# Patient Record
Sex: MALE | Race: ASIAN | NOT HISPANIC OR LATINO | Employment: FULL TIME | URBAN - METROPOLITAN AREA
[De-identification: names, ages, dates, MRNs, and addresses within clinical notes are randomized per-mention and may not be internally consistent; named-entity substitution may affect disease eponyms.]

---

## 2017-01-06 ENCOUNTER — OFFICE VISIT (OUTPATIENT)
Dept: RETAIL CLINIC | Facility: CLINIC | Age: 47
End: 2017-01-06

## 2017-01-06 VITALS
SYSTOLIC BLOOD PRESSURE: 132 MMHG | OXYGEN SATURATION: 98 % | HEART RATE: 76 BPM | RESPIRATION RATE: 20 BRPM | TEMPERATURE: 98.8 F | DIASTOLIC BLOOD PRESSURE: 96 MMHG

## 2017-01-06 DIAGNOSIS — J01.90 ACUTE RHINOSINUSITIS: Primary | ICD-10-CM

## 2017-01-06 PROCEDURE — 99213 OFFICE O/P EST LOW 20 MIN: CPT | Performed by: NURSE PRACTITIONER

## 2017-01-06 RX ORDER — PREDNISONE 20 MG/1
20 TABLET ORAL 2 TIMES DAILY
Qty: 14 TABLET | Refills: 0 | Status: SHIPPED | OUTPATIENT
Start: 2017-01-06 | End: 2017-01-13

## 2017-01-06 RX ORDER — GUAIFENESIN, PSEUDOEPHEDRINE HYDROCHLORIDE 600; 60 MG/1; MG/1
1 TABLET, EXTENDED RELEASE ORAL EVERY 12 HOURS
COMMUNITY
End: 2018-11-09

## 2017-01-06 RX ORDER — ALBUTEROL SULFATE 90 UG/1
2 AEROSOL, METERED RESPIRATORY (INHALATION) EVERY 4 HOURS PRN
COMMUNITY

## 2017-01-06 RX ORDER — AZELASTINE 1 MG/ML
2 SPRAY, METERED NASAL 2 TIMES DAILY
COMMUNITY
End: 2018-11-09

## 2017-01-06 NOTE — PROGRESS NOTES
Subjective   Ash Licona is a 46 y.o. male.     HPI Comments: Receives weekly allergy injections, last one on 1/4/17. Patient is asthmatic but has not required use of albuterol during the past week.    Sinusitis   This is a new problem. Episode onset: 4 days ago. The problem has been gradually worsening since onset. There has been no fever. Associated symptoms include congestion, headaches, neck pain and sinus pressure. Pertinent negatives include no chills, coughing, diaphoresis, ear pain, hoarse voice, shortness of breath, sneezing, sore throat or swollen glands. Past treatments include nothing.       The following portions of the patient's history were reviewed and updated as appropriate: allergies, current medications, past family history, past medical history, past social history, past surgical history and problem list.    Review of Systems   Constitutional: Positive for fatigue. Negative for appetite change, chills, diaphoresis and fever.   HENT: Positive for congestion, postnasal drip, rhinorrhea and sinus pressure. Negative for dental problem, ear discharge, ear pain, facial swelling, hearing loss, hoarse voice, mouth sores, nosebleeds, sneezing, sore throat, tinnitus, trouble swallowing and voice change.    Eyes: Negative for pain, discharge, redness and itching.   Respiratory: Positive for wheezing. Negative for cough, chest tightness, shortness of breath and stridor.    Cardiovascular: Negative for chest pain and palpitations.   Gastrointestinal: Negative for abdominal pain, constipation, diarrhea, nausea and vomiting.   Genitourinary: Negative for decreased urine volume.   Musculoskeletal: Positive for neck pain. Negative for myalgias and neck stiffness.   Skin: Negative for rash.   Allergic/Immunologic: Positive for environmental allergies.   Neurological: Positive for headaches. Negative for dizziness, syncope and weakness.       Objective   Physical Exam   Constitutional: He is oriented to person,  place, and time. He appears well-developed and well-nourished. He is cooperative.  Non-toxic appearance. He does not appear ill. No distress.   HENT:   Right Ear: Hearing, external ear and ear canal normal. Tympanic membrane is not scarred, not perforated, not erythematous, not retracted and not bulging. A middle ear effusion is present.   Left Ear: Hearing, external ear and ear canal normal. Tympanic membrane is not scarred, not perforated, not erythematous, not retracted and not bulging. A middle ear effusion is present.   Nose: Nose normal. Right sinus exhibits no maxillary sinus tenderness and no frontal sinus tenderness. Left sinus exhibits no maxillary sinus tenderness and no frontal sinus tenderness.   Mouth/Throat: Uvula is midline, oropharynx is clear and moist and mucous membranes are normal. Tonsils are 2+ on the right. Tonsils are 2+ on the left. No tonsillar exudate.   Eyes: Conjunctivae and lids are normal.   Cardiovascular: Normal rate, regular rhythm, S1 normal and S2 normal.    Pulmonary/Chest: Effort normal. He has no decreased breath sounds. He has wheezes in the right upper field and the right middle field. He has no rhonchi. He has no rales.   Abdominal: Soft. Normal appearance and bowel sounds are normal. There is no tenderness.   Lymphadenopathy:     He has no cervical adenopathy.   Neurological: He is alert and oriented to person, place, and time.   Skin: Skin is warm and dry. He is not diaphoretic.   Vitals reviewed.      Assessment/Plan   Ash was seen today for sinusitis.    Diagnoses and all orders for this visit:    Acute rhinosinusitis    Other orders  -     predniSONE (DELTASONE) 20 MG tablet; Take 1 tablet by mouth 2 (Two) Times a Day for 7 days.        -     Monitor BP and notify PCP of BP >140/90        -     Follow up with PCP for persistent or worsening symptoms

## 2017-01-06 NOTE — PATIENT INSTRUCTIONS

## 2017-01-06 NOTE — MR AVS SNAPSHOT
Ash Licona   1/6/2017 10:30 AM   Office Visit    Dept Phone:  [phone]   Encounter #:  [id number]    Provider:  Shannan Rojas   Department:  [facility]                Your Full Care Plan              Today's Medication Changes          These changes are accurate as of: 1/6/17 10:56 AM.  If you have any questions, ask your nurse or doctor.               New Medication(s)Ordered:     predniSONE 20 MG tablet   Commonly known as:  DELTASONE   Take 1 tablet by mouth 2 (Two) Times a Day for 7 days.   Started by:  Provider Maria Teresa Rojas            Where to Get Your Medications      These medications were sent to [id number] - [place], [place] - [address] AT [facility] [address] - [phone]  - [phone]   [address], [place] [address]     Phone:  [phone]     predniSONE 20 MG tablet                  Your Updated Medication List          This list is accurate as of: 1/6/17 10:56 AM.  Always use your most recent med list.                albuterol 108 (90 BASE) MCG/ACT inhaler   Commonly known as:  PROVENTIL HFA;VENTOLIN HFA       azelastine 0.1 % nasal spray   Commonly known as:  ASTELIN       fexofenadine 30 MG tablet   Commonly known as:  ALLEGRA       fluticasone 50 MCG/ACT nasal spray   Commonly known as:  FLONASE       fluticasone-salmeterol 100-50 MCG/DOSE DISKUS   Commonly known as:  ADVAIR       losartan-hydrochlorothiazide 100-25 MG per tablet   Commonly known as:  HYZAAR   Take 1 tablet by mouth daily.       montelukast 10 MG tablet   Commonly known as:  SINGULAIR       omeprazole 10 MG capsule   Commonly known as:  prilOSEC       predniSONE 20 MG tablet   Commonly known as:  DELTASONE   Take 1 tablet by mouth 2 (Two) Times a Day for 7 days.       pseudoephedrine-guaifenesin  MG per 12 hr tablet   Commonly known as:  MUCINEX D       vardenafil 20 MG tablet   Commonly known as:  LEVITRA   Take 1 tablet by  mouth as needed for erectile dysfunction.               You Were Diagnosed With        Codes Comments    Acute rhinosinusitis    -  Primary ICD-10-CM: J01.90  ICD-9-CM: 461.9       Instructions    Sinusitis, Adult  Sinusitis is redness, soreness, and inflammation of the paranasal sinuses. Paranasal sinuses are air pockets within the bones of your face. They are located beneath your eyes, in the middle of your forehead, and above your eyes. In healthy paranasal sinuses, mucus is able to drain out, and air is able to circulate through them by way of your nose. However, when your paranasal sinuses are inflamed, mucus and air can become trapped. This can allow bacteria and other germs to grow and cause infection.  Sinusitis can develop quickly and last only a short time (acute) or continue over a long period (chronic). Sinusitis that lasts for more than 12 weeks is considered chronic.  CAUSES  Causes of sinusitis include:  · Allergies.  · Structural abnormalities, such as displacement of the cartilage that separates your nostrils (deviated septum), which can decrease the air flow through your nose and sinuses and affect sinus drainage.  · Functional abnormalities, such as when the small hairs (cilia) that line your sinuses and help remove mucus do not work properly or are not present.  SIGNS AND SYMPTOMS  Symptoms of acute and chronic sinusitis are the same. The primary symptoms are pain and pressure around the affected sinuses. Other symptoms include:  · Upper toothache.  · Earache.  · Headache.  · Bad breath.  · Decreased sense of smell and taste.  · A cough, which worsens when you are lying flat.  · Fatigue.  · Fever.  · Thick drainage from your nose, which often is green and may contain pus (purulent).  · Swelling and warmth over the affected sinuses.  DIAGNOSIS  Your health care provider will perform a physical exam. During your exam, your health care provider may perform any of the following to help determine if  you have acute sinusitis or chronic sinusitis:  · Look in your nose for signs of abnormal growths in your nostrils (nasal polyps).  · Tap over the affected sinus to check for signs of infection.  · View the inside of your sinuses using an imaging device that has a light attached (endoscope).  If your health care provider suspects that you have chronic sinusitis, one or more of the following tests may be recommended:  · Allergy tests.  · Nasal culture. A sample of mucus is taken from your nose, sent to a lab, and screened for bacteria.  · Nasal cytology. A sample of mucus is taken from your nose and examined by your health care provider to determine if your sinusitis is related to an allergy.  TREATMENT  Most cases of acute sinusitis are related to a viral infection and will resolve on their own within 10 days. Sometimes, medicines are prescribed to help relieve symptoms of both acute and chronic sinusitis. These may include pain medicines, decongestants, nasal steroid sprays, or saline sprays.  However, for sinusitis related to a bacterial infection, your health care provider will prescribe antibiotic medicines. These are medicines that will help kill the bacteria causing the infection.  Rarely, sinusitis is caused by a fungal infection. In these cases, your health care provider will prescribe antifungal medicine.  For some cases of chronic sinusitis, surgery is needed. Generally, these are cases in which sinusitis recurs more than 3 times per year, despite other treatments.  HOME CARE INSTRUCTIONS  · Drink plenty of water. Water helps thin the mucus so your sinuses can drain more easily.  · Use a humidifier.  · Inhale steam 3-4 times a day (for example, sit in the bathroom with the shower running).  · Apply a warm, moist washcloth to your face 3-4 times a day, or as directed by your health care provider.  · Use saline nasal sprays to help moisten and clean your sinuses.  · Take medicines only as directed by your  health care provider.  · If you were prescribed either an antibiotic or antifungal medicine, finish it all even if you start to feel better.  SEEK IMMEDIATE MEDICAL CARE IF:  · You have increasing pain or severe headaches.  · You have nausea, vomiting, or drowsiness.  · You have swelling around your face.  · You have vision problems.  · You have a stiff neck.  · You have difficulty breathing.     This information is not intended to replace advice given to you by your health care provider. Make sure you discuss any questions you have with your health care provider.     Document Released: 12/18/2006 Document Revised: 01/08/2016 Document Reviewed: 01/01/2013  Real[employer] Interactive Patient Education ©2016 Elsevier Inc.       Patient Instructions History      Upcoming Appointments     Visit Type Date Time Department    OFFICE VISIT 1/6/2017 10:30 AM [facility]      avolution Signup     Our records indicate that you have an active Rewarder account.    You can view your After Visit Summary by going to MobileIgniter and logging in with your avolution username and password.  If you don't have a avolution username and password but a parent or guardian has access to your record, the parent or guardian should login with their own avolution username and password and access your record to view the After Visit Summary.    If you have questions, you can email General [email or web address] or call [phone] to talk to our avolution staff.  Remember, avolution is NOT to be used for urgent needs.  For medical emergencies, dial 911.               Other Info from Your Visit           Allergies     Iodine        Reason for Visit     Sinusitis           Vital Signs     Smoking Status                   Former Smoker           Problems and Diagnoses Noted     Acute sinus infection    -  Primary

## 2017-01-17 ENCOUNTER — HOSPITAL ENCOUNTER (OUTPATIENT)
Dept: GENERAL RADIOLOGY | Facility: HOSPITAL | Age: 47
Discharge: HOME | End: 2017-01-17
Admitting: NURSE PRACTITIONER

## 2017-01-17 ENCOUNTER — TELEPHONE (OUTPATIENT)
Dept: INTERNAL MEDICINE | Facility: CLINIC | Age: 47
End: 2017-01-17

## 2017-01-17 DIAGNOSIS — R06.02 SHORTNESS OF BREATH: ICD-10-CM

## 2017-01-17 DIAGNOSIS — R09.89 CHEST CONGESTION: Primary | ICD-10-CM

## 2017-01-17 PROCEDURE — 71020 HC CHEST PA AND LATERAL: CPT

## 2017-01-17 NOTE — TELEPHONE ENCOUNTER
----- Message from Shabana Willett MA sent at 2017  1:16 PM EST -----  Regarding: FW: X-RAY  Contact: [phone]      ----- Message -----     From: Sophia Rubio     Sent: 2017   1:02 PM       To: Navneet Magallanes [facility] Clinical Pool  Subject: X-RAY                                            :  68    CANDIDO PATIENT    PATIENT IS HAVING TROUBLE BREATHING. HIS WIFE WAS WONDERING IF HE SHOULD HAVE AN X-RAY BEFORE TOMORROW'S VISIT.

## 2017-01-17 NOTE — TELEPHONE ENCOUNTER
Patient's wife advised as per below.      ----- Message from Shabana Willett MA sent at 1/17/2017  4:01 PM EST -----      ----- Message -----     From: NAZANIN Collins     Sent: 1/17/2017   3:44 PM       To: Navneet Magallanes [facility] Clinical Pool    CXR is negative for pneumonia  ----- Message -----     From: Interface, Rad Results [place] In     Sent: 1/17/2017   3:35 PM       To: NAZANIN Collins

## 2017-01-17 NOTE — TELEPHONE ENCOUNTER
Spoke with wife.  Actually, patient did NOT go to [facility] ER.  He was evaluated by our [facility] on  for sinusitis and given Deltasone only.  He chest congestion was not an issue then.  He developed chest congestion within the last few days and developed SOA last night with some dizziness.  Per my conversation with Candido, I ordered CXR PA/LAT stat, and patient is going now for that test.  I explained to wife that patient needs to proceed to [facility] ER immediately if SOA worsens before appt tomorrow.  She voiced understanding.      ----- Message from Shabana Willett MA sent at 2017  1:16 PM EST -----  Regarding: FW: X-RAY  Contact: [phone]      ----- Message -----     From: Sophia Rubio     Sent: 2017   1:02 PM       [facility]  Subject: X-RAY                                            :  68    CANDIDO PATIENT    PATIENT IS HAVING TROUBLE BREATHING. HIS WIFE WAS WONDERING IF HE SHOULD HAVE AN X-RAY BEFORE TOMORROW'S VISIT.

## 2017-01-18 ENCOUNTER — OFFICE VISIT (OUTPATIENT)
Dept: INTERNAL MEDICINE | Facility: CLINIC | Age: 47
End: 2017-01-18

## 2017-01-18 VITALS
SYSTOLIC BLOOD PRESSURE: 144 MMHG | DIASTOLIC BLOOD PRESSURE: 90 MMHG | HEIGHT: 72 IN | BODY MASS INDEX: 29.8 KG/M2 | OXYGEN SATURATION: 98 % | WEIGHT: 220 LBS | TEMPERATURE: 97.9 F | HEART RATE: 71 BPM

## 2017-01-18 DIAGNOSIS — J10.1 INFLUENZA A: ICD-10-CM

## 2017-01-18 DIAGNOSIS — J45.901 ASTHMATIC BRONCHITIS WITH ACUTE EXACERBATION: Primary | ICD-10-CM

## 2017-01-18 DIAGNOSIS — R50.9 FEVER AND CHILLS: ICD-10-CM

## 2017-01-18 LAB
ALBUMIN SERPL-MCNC: 4.7 G/DL (ref 3.5–5.2)
ALBUMIN/GLOB SERPL: 1.7 G/DL
ALP SERPL-CCNC: 57 U/L (ref 40–129)
ALT SERPL-CCNC: 27 U/L (ref 5–41)
AST SERPL-CCNC: 27 U/L (ref 5–40)
BASOPHILS # BLD AUTO: 0.06 10*3/MM3 (ref 0–0.2)
BASOPHILS NFR BLD AUTO: 1 % (ref 0–2)
BILIRUB SERPL-MCNC: 0.5 MG/DL (ref 0.2–1.2)
BUN SERPL-MCNC: 11 MG/DL (ref 6–20)
BUN/CREAT SERPL: 13.8 (ref 7–25)
CALCIUM SERPL-MCNC: 9.2 MG/DL (ref 8.6–10.5)
CHLORIDE SERPL-SCNC: 92 MMOL/L (ref 98–107)
CO2 SERPL-SCNC: 27.1 MMOL/L (ref 22–29)
CREAT SERPL-MCNC: 0.8 MG/DL (ref 0.76–1.27)
EOSINOPHIL # BLD AUTO: 0.08 10*3/MM3 (ref 0.1–0.3)
EOSINOPHIL NFR BLD AUTO: 1.3 % (ref 0–4)
ERYTHROCYTE [DISTWIDTH] IN BLOOD BY AUTOMATED COUNT: 13.2 % (ref 11.5–14.5)
GLOBULIN SER CALC-MCNC: 2.8 GM/DL
GLUCOSE SERPL-MCNC: 98 MG/DL (ref 65–99)
HCT VFR BLD AUTO: 47.3 % (ref 42–52)
HGB BLD-MCNC: 15.9 G/DL (ref 14–18)
IMM GRANULOCYTES # BLD: 0.06 10*3/MM3 (ref 0–0.03)
IMM GRANULOCYTES NFR BLD: 1 % (ref 0–0.5)
LYMPHOCYTES # BLD AUTO: 0.98 10*3/MM3 (ref 0.6–4.8)
LYMPHOCYTES NFR BLD AUTO: 16.2 % (ref 20–45)
MCH RBC QN AUTO: 28.8 PG (ref 27–31)
MCHC RBC AUTO-ENTMCNC: 33.6 G/DL (ref 31–37)
MCV RBC AUTO: 85.5 FL (ref 80–94)
MONOCYTES # BLD AUTO: 0.64 10*3/MM3 (ref 0–1)
MONOCYTES NFR BLD AUTO: 10.6 % (ref 3–8)
NEUTROPHILS # BLD AUTO: 4.23 10*3/MM3 (ref 1.5–8.3)
NEUTROPHILS NFR BLD AUTO: 69.9 % (ref 45–70)
NRBC BLD AUTO-RTO: 0 /100 WBC (ref 0–0)
PLATELET # BLD AUTO: 298 10*3/MM3 (ref 140–500)
POTASSIUM SERPL-SCNC: 3.5 MMOL/L (ref 3.5–5.2)
PROT SERPL-MCNC: 7.5 G/DL (ref 6–8.5)
RBC # BLD AUTO: 5.53 10*6/MM3 (ref 4.7–6.1)
SODIUM SERPL-SCNC: 134 MMOL/L (ref 136–145)
WBC # BLD AUTO: 6.05 10*3/MM3 (ref 4.8–10.8)

## 2017-01-18 PROCEDURE — 99213 OFFICE O/P EST LOW 20 MIN: CPT | Performed by: NURSE PRACTITIONER

## 2017-01-18 RX ORDER — OSELTAMIVIR PHOSPHATE 75 MG/1
75 CAPSULE ORAL 2 TIMES DAILY
Qty: 10 CAPSULE | Refills: 0 | Status: SHIPPED | OUTPATIENT
Start: 2017-01-18 | End: 2018-11-09

## 2017-01-18 NOTE — PATIENT INSTRUCTIONS
Influenza Tests  WHY AM I HAVING THIS TEST?  You may have an influenza test to help your health care provider determine what type of respiratory infection you have. The test may also be used to help determine a treatment plan and to monitor influenza activity within a community.  There are two types of influenza virus: types A and B. Often, one strain of type A influenza will be the most common type of influenza in a community during flu season. This is typically between the months of October and May. Influenza tests can help determine which strain of influenza type A is occurring most often in the community.  WHAT KIND OF SAMPLE IS TAKEN?  Influenza tests are performed by collecting a small sample of fluids (secretions) from your nose or throat using a cotton swab. Tests performed on nasal secretions are more accurate than tests performed on a sample taken from your throat.  · Rapid influenza tests are available and have become the most frequently used tests for influenza. They are most accurate when completed within the first 48 hours after your symptoms begin.    Depending on the method, a rapid influenza test may be completed in your health care provider's office in less than 30 minutes. It can also be sent to a lab with the results available the same day.    Depending on the particular type of test used, it can identify influenza type A, a mixture of types A and B, or differentiate between type A and B.  · Another test that your health care provider may order is a viral culture. This also requires the collection of secretions from your nose or throat. The sample is then sent to a lab for processing. This may take several days to complete.  HOW ARE YOUR TEST RESULTS REPORTED?  Your test results will be reported as either positive or negative. A false-negative result can occur. A false-negative result is incorrect because it indicates a condition or finding is not present when it is.  It is your responsibility to  obtain your test results. Ask the lab or department performing the test when and how you will get your results.  WHAT DO THE RESULTS MEAN?  · A positive test means you have influenza. Tests may further determine the type of influenza you have.  · A negative influenza test result means it is not likely that you have influenza.  · A false-negative result can occur. False-negative results are more likely to happen at the height of the influenza season.  Talk with your health care provider to discuss your results, treatment options, and if necessary, the need for more tests. Talk with your health care provider if you have any questions about your results.     This information is not intended to replace advice given to you by your health care provider. Make sure you discuss any questions you have with your health care provider.     Document Released: 09/27/2006 Document Revised: 01/08/2016 Document Reviewed: 05/06/2015  Elsevier Interactive Patient Education ©2016 Elsevier Inc.

## 2017-01-18 NOTE — MR AVS SNAPSHOT
Ash Livan   1/18/2017 9:30 AM   Office Visit    Dept Phone:  [phone]   Encounter #:  [id number]    Provider:  NAZANIN Collins   Department:  [facility] P[facility]                Your Full Care Plan              Today's Medication Changes          These changes are accurate as of: 1/18/17 10:43 AM.  If you have any questions, ask your nurse or doctor.               New Medication(s)Ordered:     fluticasone-salmeterol 250-50 MCG/DOSE DISKUS   Commonly known as:  ADVAIR DISKUS   Inhale 1 puff 2 (Two) Times a Day.   Replaces:  fluticasone-salmeterol 100-50 MCG/DOSE DISKUS   Started by:  NAZANIN Collins       oseltamivir 75 MG capsule   Commonly known as:  TAMIFLU   Take 1 capsule by mouth 2 (Two) Times a Day.   Started by:  NAZANIN Collins         Stop taking medication(s)listed here:     fluticasone-salmeterol 100-50 MCG/DOSE DISKUS   Commonly known as:  ADVAIR   Replaced by:  fluticasone-salmeterol 250-50 MCG/DOSE DISKUS   Stopped by:  NAZANIN Collins                Where to Get Your Medications      These medications were sent to [facility] [address] [place] [address] AT [facility] & [address] - [phone]  - [phone] [address] [address]     Phone:  [phone]     fluticasone-salmeterol 250-50 MCG/DOSE DISKUS    oseltamivir 75 MG capsule                  Your Updated Medication List          This list is accurate as of: 1/18/17 10:43 AM.  Always use your most recent med list.                albuterol 108 (90 BASE) MCG/ACT inhaler   Commonly known as:  PROVENTIL HFA;VENTOLIN HFA       azelastine 0.1 % nasal spray   Commonly known as:  ASTELIN       fexofenadine 30 MG tablet   Commonly known as:  ALLEGRA       fluticasone 50 MCG/ACT nasal spray   Commonly known as:  FLONASE       fluticasone-salmeterol 250-50 MCG/DOSE DISKUS   Commonly known as:  ADVAIR DISKUS   Inhale 1 puff 2 (Two)  Times a Day.       losartan-hydrochlorothiazide 100-25 MG per tablet   Commonly known as:  HYZAAR   Take 1 tablet by mouth daily.       montelukast 10 MG tablet   Commonly known as:  SINGULAIR       omeprazole 10 MG capsule   Commonly known as:  prilOSEC       oseltamivir 75 MG capsule   Commonly known as:  TAMIFLU   Take 1 capsule by mouth 2 (Two) Times a Day.       pseudoephedrine-guaifenesin  MG per 12 hr tablet   Commonly known as:  MUCINEX D       vardenafil 20 MG tablet   Commonly known as:  LEVITRA   Take 1 tablet by mouth as needed for erectile dysfunction.               We Performed the Following     CBC & Differential     Comprehensive metabolic panel     eGFR-Glomerular Filtration       You Were Diagnosed With        Codes Comments    Asthmatic bronchitis with acute exacerbation    -  Primary ICD-10-CM: J45.901  ICD-9-CM: 493.92     Fever and chills     ICD-10-CM: R50.9  ICD-9-CM: 780.60     Influenza A     ICD-10-CM: J10.1  ICD-9-CM: 487.1       Instructions    Influenza Tests  WHY AM I HAVING THIS TEST?  You may have an influenza test to help your health care provider determine what type of respiratory infection you have. The test may also be used to help determine a treatment plan and to monitor influenza activity within a community.  There are two types of influenza virus: types A and B. Often, one strain of type A influenza will be the most common type of influenza in a community during flu season. This is typically between the months of October and May. Influenza tests can help determine which strain of influenza type A is occurring most often in the community.  WHAT KIND OF SAMPLE IS TAKEN?  Influenza tests are performed by collecting a small sample of fluids (secretions) from your nose or throat using a cotton swab. Tests performed on nasal secretions are more accurate than tests performed on a sample taken from your throat.  · Rapid influenza tests are available and have become the most  frequently used tests for influenza. They are most accurate when completed within the first 48 hours after your symptoms begin.    Depending on the method, a rapid influenza test may be completed in your health care provider's office in less than 30 minutes. It can also be sent to a lab with the results available the same day.    Depending on the particular type of test used, it can identify influenza type A, a mixture of types A and B, or differentiate between type A and B.  · Another test that your health care provider may order is a viral culture. This also requires the collection of secretions from your nose or throat. The sample is then sent to a lab for processing. This may take several days to complete.  HOW ARE YOUR TEST RESULTS REPORTED?  Your test results will be reported as either positive or negative. A false-negative result can occur. A false-negative result is incorrect because it indicates a condition or finding is not present when it is.  It is your responsibility to obtain your test results. Ask the lab or department performing the test when and how you will get your results.  WHAT DO THE RESULTS MEAN?  · A positive test means you have influenza. Tests may further determine the type of influenza you have.  · A negative influenza test result means it is not likely that you have influenza.  · A false-negative result can occur. False-negative results are more likely to happen at the height of the influenza season.  Talk with your health care provider to discuss your results, treatment options, and if necessary, the need for more tests. Talk with your health care provider if you have any questions about your results.     This information is not intended to replace advice given to you by your health care provider. Make sure you discuss any questions you have with your health care provider.     Document Released: 09/27/2006 Document Revised: 01/08/2016 Document Reviewed: 05/06/2015  [employer]  "Patient Education ©2016 [email or web address].       Patient Instructions History      Upcoming Appointments     Visit Type Date Time Department    HOSPITAL FOLLOW UP 1/18/2017  9:30 AM [facility]      [email or web address] Signup     Our records indicate that you have an active Jugo account.    You can view your After Visit Summary by going to Deliveroo and logging in with your [email or web address] username and password.  If you don't have a [email or web address] username and password but a parent or guardian has access to your record, the parent or guardian should login with their own [email or web address] username and password and access your record to view the After Visit Summary.    If you have questions, you can email [email or web address] or call [phone] to talk to our [email or web address] staff.  Remember, [email or web address] is NOT to be used for urgent needs.  For medical emergencies, dial 911.               Other Info from Your Visit           Allergies     Iodine        Reason for Visit     URI     Sinusitis     Shortness of Breath     Asthma           Vital Signs     Blood Pressure Pulse Temperature Height Weight Oxygen Saturation    144/90 71 97.9 °F (36.6 °C) 72\" (182.9 cm) 220 lb (99.8 kg) 98%    Body Mass Index Smoking Status                29.84 kg/m2 Former Smoker          Problems and Diagnoses Noted     Asthmatic bronchitis with acute exacerbation    Influenza A    Fever and chills            "

## 2017-01-18 NOTE — PROGRESS NOTES
Chief Complaint   Patient presents with   • URI   • Sinusitis   • Shortness of Breath   • Asthma       Subjective   Ash Licona is a 46 y.o. male is being seen for an acute appointment for URI symptoms. This started 2 weeks ago. It started with sinus pressure, nasal congestion. He went to the urgent care 1-6-2017, and he was given 7 days of prednisone. He has progressively worsened Now with fever, chills, nasal congestion, chest congestion. He did have a flu shot. CXR complete yesterday with normal results. Associated weakness and vertigo. He s currently on Singulair, Astelin, Allegra, Advair 100/50. He is using his rescue inhaler daily. He tried pseudafed 24 hr.     History of Present Illness     Current Outpatient Prescriptions on File Prior to Visit   Medication Sig Dispense Refill   • albuterol (PROVENTIL HFA;VENTOLIN HFA) 108 (90 BASE) MCG/ACT inhaler Inhale 2 puffs Every 4 (Four) Hours As Needed for wheezing or shortness of air.     • azelastine (ASTELIN) 0.1 % nasal spray 2 sprays into each nostril 2 (Two) Times a Day. Use in each nostril as directed     • fexofenadine (ALLEGRA) 30 MG tablet Take 30 mg by mouth 2 (two) times a day.     • fluticasone (FLONASE) 50 MCG/ACT nasal spray into each nostril.     • fluticasone-salmeterol (ADVAIR) 100-50 MCG/DOSE DISKUS every 12 (twelve) hours.     • losartan-hydrochlorothiazide (HYZAAR) 100-25 MG per tablet Take 1 tablet by mouth daily. 30 tablet 6   • montelukast (SINGULAIR) 10 MG tablet Take  by mouth.     • omeprazole (PriLOSEC) 10 MG capsule Take 10 mg by mouth daily.     • pseudoephedrine-guaifenesin (MUCINEX D)  MG per 12 hr tablet Take 1 tablet by mouth Every 12 (Twelve) Hours.     • vardenafil (LEVITRA) 20 MG tablet Take 1 tablet by mouth as needed for erectile dysfunction. 6 tablet 0     No current facility-administered medications on file prior to visit.        The following portions of the patient's history were reviewed and updated as appropriate:  allergies, current medications, past family history, past medical history, past social history, past surgical history and problem list.    Review of Systems   Constitutional: Positive for activity change, appetite change, chills, fatigue and fever.   HENT: Positive for congestion, postnasal drip, rhinorrhea, sneezing and voice change. Negative for ear discharge, ear pain and sore throat.    Eyes: Negative.    Respiratory: Positive for cough and shortness of breath. Negative for wheezing.    Cardiovascular: Negative for chest pain, palpitations and leg swelling.   Gastrointestinal: Negative.    Endocrine: Negative.    Allergic/Immunologic: Positive for environmental allergies.   Neurological: Negative.        Objective   Physical Exam   Constitutional: He appears lethargic. He has a sickly appearance.   HENT:   Head: Normocephalic.   Right Ear: External ear normal.   Left Ear: External ear normal.   Nose: Nose normal.   Mouth/Throat: Oropharynx is clear and moist. No oropharyngeal exudate.   Eyes: Right eye exhibits no discharge. Left eye exhibits no discharge.   Neck: Neck supple.   Cardiovascular: Normal rate, regular rhythm and normal heart sounds.    No murmur heard.  Pulmonary/Chest: Effort normal. No respiratory distress. He has decreased breath sounds. He has no wheezes.   Abdominal: Soft. Bowel sounds are normal.   Lymphadenopathy:     He has cervical adenopathy.   Neurological: He appears lethargic.   Skin: Skin is warm and dry.   Psychiatric: His behavior is normal. His affect is blunt.       Assessment/Plan   Ash was seen today for uri, sinusitis, shortness of breath and asthma.    Diagnoses and all orders for this visit:    Asthmatic bronchitis with acute exacerbation  -     CBC & Differential  -     Comprehensive metabolic panel  -     eGFR-Glomerular Filtration  -     fluticasone-salmeterol (ADVAIR DISKUS) 250-50 MCG/DOSE DISKUS; Inhale 1 puff 2 (Two) Times a Day.    Fever and chills  -     CBC &  Differential  -     Comprehensive metabolic panel  -     eGFR-Glomerular Filtration    Influenza A  -     CBC & Differential  -     Comprehensive metabolic panel  -     eGFR-Glomerular Filtration  -     oseltamivir (TAMIFLU) 75 MG capsule; Take 1 capsule by mouth 2 (Two) Times a Day.    Other orders  -     Cancel: Mononucleosis Screen  -     Cancel: TSH    Viral precautions, rest and fluids.

## 2017-03-31 RX ORDER — LOSARTAN POTASSIUM AND HYDROCHLOROTHIAZIDE 25; 100 MG/1; MG/1
1 TABLET ORAL DAILY
Qty: 30 TABLET | Refills: 6 | Status: SHIPPED | OUTPATIENT
Start: 2017-03-31 | End: 2018-02-05 | Stop reason: SDUPTHER

## 2017-04-08 ENCOUNTER — OFFICE VISIT (OUTPATIENT)
Dept: RETAIL CLINIC | Facility: CLINIC | Age: 47
End: 2017-04-08

## 2017-04-08 VITALS
RESPIRATION RATE: 16 BRPM | SYSTOLIC BLOOD PRESSURE: 114 MMHG | HEART RATE: 104 BPM | TEMPERATURE: 99.1 F | DIASTOLIC BLOOD PRESSURE: 74 MMHG | OXYGEN SATURATION: 98 %

## 2017-04-08 DIAGNOSIS — J01.00 ACUTE MAXILLARY SINUSITIS, RECURRENCE NOT SPECIFIED: Primary | ICD-10-CM

## 2017-04-08 PROBLEM — J34.9 SINUS PROBLEM: Status: ACTIVE | Noted: 2017-04-08

## 2017-04-08 PROCEDURE — 99213 OFFICE O/P EST LOW 20 MIN: CPT | Performed by: NURSE PRACTITIONER

## 2017-04-08 RX ORDER — AMOXICILLIN AND CLAVULANATE POTASSIUM 875; 125 MG/1; MG/1
1 TABLET, FILM COATED ORAL 2 TIMES DAILY
Qty: 20 TABLET | Refills: 0 | Status: SHIPPED | OUTPATIENT
Start: 2017-04-08 | End: 2017-04-18

## 2017-04-08 NOTE — PROGRESS NOTES
Subjective   Ash Licona is a 46 y.o. male.     Sinus Problem   The current episode started 1 to 4 weeks ago. The problem has been gradually worsening since onset. Maximum temperature: 99-99.9. The fever has been present for 1 to 2 days. His pain is at a severity of 6/10. The pain is moderate. Associated symptoms include congestion, coughing (history of asthma, occasional cough), headaches and sinus pressure. Pertinent negatives include no ear pain, neck pain or sore throat. Past treatments include oral decongestants and acetaminophen. The treatment provided mild relief.       The following portions of the patient's history were reviewed and updated as appropriate: allergies, current medications, past family history, past medical history, past social history, past surgical history and problem list.    Review of Systems   Constitutional: Positive for activity change, appetite change, fatigue and fever.   HENT: Positive for congestion, rhinorrhea and sinus pressure. Negative for ear discharge, ear pain, sore throat and trouble swallowing.    Eyes: Negative for pain, discharge, redness, itching and visual disturbance.   Respiratory: Positive for cough (history of asthma, occasional cough) and wheezing (occasional, relates to asthma history, has inhaler). Negative for chest tightness.    Cardiovascular: Negative for chest pain and palpitations.   Gastrointestinal: Negative for abdominal distention, abdominal pain, constipation, diarrhea, nausea and vomiting.   Endocrine: Negative for cold intolerance.   Genitourinary: Negative for difficulty urinating.   Musculoskeletal: Negative for neck pain and neck stiffness.   Skin: Negative for color change, pallor and rash.   Allergic/Immunologic: Positive for environmental allergies.   Neurological: Positive for headaches. Negative for dizziness.   Psychiatric/Behavioral: Negative for agitation and behavioral problems.   All other systems reviewed and are  negative.      Objective   Physical Exam   Constitutional: He is oriented to person, place, and time. He appears well-developed and well-nourished.   HENT:   Head: Normocephalic.   Right Ear: Hearing, tympanic membrane, external ear and ear canal normal.   Left Ear: Hearing, external ear and ear canal normal. Tympanic membrane is erythematous and retracted.   Nose: Rhinorrhea and sinus tenderness present. Right sinus exhibits maxillary sinus tenderness and frontal sinus tenderness. Left sinus exhibits maxillary sinus tenderness and frontal sinus tenderness.   Mouth/Throat: Uvula is midline, oropharynx is clear and moist and mucous membranes are normal.   Purulent nasal drainage   Eyes: Lids are normal. Pupils are equal, round, and reactive to light.   Neck: Trachea normal and full passive range of motion without pain. Neck supple.   Cardiovascular: Normal rate, regular rhythm and normal heart sounds.    Pulmonary/Chest: Effort normal and breath sounds normal.   Occasional coarse cough noted.  Not productive during exam. History of asthma, patient has asthma maintenance plan and is educated on the use.  No need for refills. Normal exam, no increased work of breathing.  Pink and perfused on room air.   Abdominal: Soft. Normal appearance and bowel sounds are normal. There is no tenderness.   Musculoskeletal: Normal range of motion.   Lymphadenopathy:     He has no cervical adenopathy.   Neurological: He is alert and oriented to person, place, and time. He has normal strength.   Skin: Skin is warm, dry and intact. No rash noted.   Psychiatric: He has a normal mood and affect. His speech is normal. Judgment and thought content normal. Cognition and memory are normal.       Assessment/Plan   Ash was seen today for sinus problem.    Diagnoses and all orders for this visit:    Acute maxillary sinusitis, recurrence not specified  -     amoxicillin-clavulanate (AUGMENTIN) 875-125 MG per tablet; Take 1 tablet by mouth 2  (Two) Times a Day for 10 days.       Patient agrees with treatment plan and understands when to return to PCP or seek ER care.  Patient understands to adhere to asthma plan as needed for symptoms.

## 2017-10-10 ENCOUNTER — FLU SHOT (OUTPATIENT)
Dept: INTERNAL MEDICINE | Facility: CLINIC | Age: 47
End: 2017-10-10

## 2017-10-10 PROCEDURE — 90686 IIV4 VACC NO PRSV 0.5 ML IM: CPT | Performed by: NURSE PRACTITIONER

## 2017-10-10 PROCEDURE — 90471 IMMUNIZATION ADMIN: CPT | Performed by: NURSE PRACTITIONER

## 2018-02-05 RX ORDER — LOSARTAN POTASSIUM AND HYDROCHLOROTHIAZIDE 25; 100 MG/1; MG/1
1 TABLET ORAL DAILY
Qty: 90 TABLET | Refills: 1 | Status: SHIPPED | OUTPATIENT
Start: 2018-02-05 | End: 2018-10-30 | Stop reason: SDUPTHER

## 2018-02-05 RX ORDER — LOSARTAN POTASSIUM AND HYDROCHLOROTHIAZIDE 25; 100 MG/1; MG/1
1 TABLET ORAL DAILY
Qty: 30 TABLET | Refills: 6 | Status: SHIPPED | OUTPATIENT
Start: 2018-02-05 | End: 2018-02-05 | Stop reason: SDUPTHER

## 2018-08-02 RX ORDER — LOSARTAN POTASSIUM AND HYDROCHLOROTHIAZIDE 25; 100 MG/1; MG/1
1 TABLET ORAL DAILY
Qty: 90 TABLET | Refills: 0 | OUTPATIENT
Start: 2018-08-02

## 2018-10-30 ENCOUNTER — TELEPHONE (OUTPATIENT)
Dept: INTERNAL MEDICINE | Facility: CLINIC | Age: 48
End: 2018-10-30

## 2018-10-30 RX ORDER — LOSARTAN POTASSIUM AND HYDROCHLOROTHIAZIDE 25; 100 MG/1; MG/1
1 TABLET ORAL DAILY
Qty: 30 TABLET | Refills: 0 | Status: SHIPPED | OUTPATIENT
Start: 2018-10-30 | End: 2018-11-09 | Stop reason: SDUPTHER

## 2018-10-30 NOTE — TELEPHONE ENCOUNTER
SENT TO PHARMACY   WIFE AWARE      ----- Message from NAZANIN Collins sent at 10/30/2018 12:38 PM EDT -----  Regarding: RE: SCRIPT VS APPT  Yes, send in a 30 day script.    ----- Message -----  From: Veda Dinero MA  Sent: 10/30/2018  12:37 PM  To: NAZANIN Collins  Subject: FW: SCRIPT VS APPT                                   ----- Message -----  From: Yenny Cabrales  Sent: 10/30/2018  12:24 PM  To: Veda Dinero MA  Subject: SCRIPT VS APPT                                   ARNOL    Patient has made appointment for BP/Med Check on Friday 11/9 and a CPE in February.  He will run out of Losartan on Sunday 11/4.  Can script be sent in for same to at least get him to appointment date?

## 2018-11-09 ENCOUNTER — OFFICE VISIT (OUTPATIENT)
Dept: INTERNAL MEDICINE | Facility: CLINIC | Age: 48
End: 2018-11-09

## 2018-11-09 VITALS
WEIGHT: 221 LBS | SYSTOLIC BLOOD PRESSURE: 120 MMHG | HEIGHT: 72 IN | HEART RATE: 62 BPM | BODY MASS INDEX: 29.93 KG/M2 | OXYGEN SATURATION: 98 % | DIASTOLIC BLOOD PRESSURE: 82 MMHG

## 2018-11-09 DIAGNOSIS — Z23 NEEDS FLU SHOT: Primary | ICD-10-CM

## 2018-11-09 DIAGNOSIS — Z23 IMMUNIZATION DUE: ICD-10-CM

## 2018-11-09 DIAGNOSIS — I10 BENIGN ESSENTIAL HTN: ICD-10-CM

## 2018-11-09 DIAGNOSIS — E78.2 MIXED HYPERLIPIDEMIA: ICD-10-CM

## 2018-11-09 DIAGNOSIS — Z12.5 PROSTATE CANCER SCREENING: ICD-10-CM

## 2018-11-09 PROBLEM — J10.1 INFLUENZA A: Status: RESOLVED | Noted: 2017-01-18 | Resolved: 2018-11-09

## 2018-11-09 PROBLEM — J34.9 SINUS PROBLEM: Status: RESOLVED | Noted: 2017-04-08 | Resolved: 2018-11-09

## 2018-11-09 PROCEDURE — 90674 CCIIV4 VAC NO PRSV 0.5 ML IM: CPT | Performed by: NURSE PRACTITIONER

## 2018-11-09 PROCEDURE — 90471 IMMUNIZATION ADMIN: CPT | Performed by: NURSE PRACTITIONER

## 2018-11-09 PROCEDURE — 90472 IMMUNIZATION ADMIN EACH ADD: CPT | Performed by: NURSE PRACTITIONER

## 2018-11-09 PROCEDURE — 90632 HEPA VACCINE ADULT IM: CPT | Performed by: NURSE PRACTITIONER

## 2018-11-09 PROCEDURE — 99213 OFFICE O/P EST LOW 20 MIN: CPT | Performed by: NURSE PRACTITIONER

## 2018-11-09 PROCEDURE — 90715 TDAP VACCINE 7 YRS/> IM: CPT | Performed by: NURSE PRACTITIONER

## 2018-11-09 RX ORDER — LOSARTAN POTASSIUM AND HYDROCHLOROTHIAZIDE 25; 100 MG/1; MG/1
1 TABLET ORAL DAILY
Qty: 90 TABLET | Refills: 3 | Status: SHIPPED | OUTPATIENT
Start: 2018-11-09 | End: 2019-11-13 | Stop reason: SDUPTHER

## 2019-02-01 ENCOUNTER — LAB (OUTPATIENT)
Dept: INTERNAL MEDICINE | Facility: CLINIC | Age: 49
End: 2019-02-01

## 2019-02-01 DIAGNOSIS — Z12.5 PROSTATE CANCER SCREENING: ICD-10-CM

## 2019-02-01 DIAGNOSIS — E78.2 MIXED HYPERLIPIDEMIA: ICD-10-CM

## 2019-02-01 DIAGNOSIS — I10 BENIGN ESSENTIAL HTN: ICD-10-CM

## 2019-02-01 LAB
ALBUMIN SERPL-MCNC: 4.8 G/DL (ref 3.5–5.2)
ALBUMIN/GLOB SERPL: 1.7 G/DL
ALP SERPL-CCNC: 48 U/L (ref 40–129)
ALT SERPL-CCNC: 38 U/L (ref 5–41)
AST SERPL-CCNC: 26 U/L (ref 5–40)
BASOPHILS # BLD AUTO: 0.06 10*3/MM3 (ref 0–0.2)
BASOPHILS NFR BLD AUTO: 1.3 % (ref 0–2)
BILIRUB SERPL-MCNC: 0.5 MG/DL (ref 0.2–1.2)
BUN SERPL-MCNC: 11 MG/DL (ref 6–20)
BUN/CREAT SERPL: 14.3 (ref 7–25)
CALCIUM SERPL-MCNC: 9.7 MG/DL (ref 8.6–10.5)
CHLORIDE SERPL-SCNC: 91 MMOL/L (ref 98–107)
CHOLEST SERPL-MCNC: 207 MG/DL (ref 0–200)
CHOLEST/HDLC SERPL: 4.81 {RATIO}
CO2 SERPL-SCNC: 27.5 MMOL/L (ref 22–29)
CREAT SERPL-MCNC: 0.77 MG/DL (ref 0.76–1.27)
EOSINOPHIL # BLD AUTO: 0.11 10*3/MM3 (ref 0.1–0.3)
EOSINOPHIL NFR BLD AUTO: 2.4 % (ref 0–4)
ERYTHROCYTE [DISTWIDTH] IN BLOOD BY AUTOMATED COUNT: 12.7 % (ref 11.5–14.5)
GLOBULIN SER CALC-MCNC: 2.9 GM/DL
GLUCOSE SERPL-MCNC: 95 MG/DL (ref 65–99)
HCT VFR BLD AUTO: 48.1 % (ref 42–52)
HDLC SERPL-MCNC: 43 MG/DL (ref 40–60)
HGB BLD-MCNC: 16.3 G/DL (ref 14–18)
IMM GRANULOCYTES # BLD AUTO: 0.01 10*3/MM3 (ref 0–0.03)
IMM GRANULOCYTES NFR BLD AUTO: 0.2 % (ref 0–0.5)
LDLC SERPL CALC-MCNC: 110 MG/DL (ref 0–100)
LYMPHOCYTES # BLD AUTO: 1.62 10*3/MM3 (ref 0.6–4.8)
LYMPHOCYTES NFR BLD AUTO: 34.6 % (ref 20–45)
MCH RBC QN AUTO: 29.3 PG (ref 27–31)
MCHC RBC AUTO-ENTMCNC: 33.9 G/DL (ref 31–37)
MCV RBC AUTO: 86.5 FL (ref 80–94)
MONOCYTES # BLD AUTO: 0.34 10*3/MM3 (ref 0–1)
MONOCYTES NFR BLD AUTO: 7.3 % (ref 3–8)
NEUTROPHILS # BLD AUTO: 2.54 10*3/MM3 (ref 1.5–8.3)
NEUTROPHILS NFR BLD AUTO: 54.2 % (ref 45–70)
NRBC BLD AUTO-RTO: 0 /100 WBC (ref 0–0)
PLATELET # BLD AUTO: 265 10*3/MM3 (ref 140–500)
POTASSIUM SERPL-SCNC: 3.7 MMOL/L (ref 3.5–5.2)
PROT SERPL-MCNC: 7.7 G/DL (ref 6–8.5)
PSA SERPL-MCNC: 0.45 NG/ML (ref 0–4)
RBC # BLD AUTO: 5.56 10*6/MM3 (ref 4.7–6.1)
SODIUM SERPL-SCNC: 134 MMOL/L (ref 136–145)
TRIGL SERPL-MCNC: 271 MG/DL (ref 0–150)
VLDLC SERPL CALC-MCNC: 54.2 MG/DL (ref 8–32)
WBC # BLD AUTO: 4.68 10*3/MM3 (ref 4.8–10.8)

## 2019-02-06 ENCOUNTER — OFFICE VISIT (OUTPATIENT)
Dept: INTERNAL MEDICINE | Facility: CLINIC | Age: 49
End: 2019-02-06

## 2019-02-06 VITALS
RESPIRATION RATE: 16 BRPM | TEMPERATURE: 98.4 F | HEIGHT: 72 IN | BODY MASS INDEX: 29.8 KG/M2 | WEIGHT: 220 LBS | OXYGEN SATURATION: 96 % | HEART RATE: 83 BPM | SYSTOLIC BLOOD PRESSURE: 122 MMHG | DIASTOLIC BLOOD PRESSURE: 82 MMHG

## 2019-02-06 DIAGNOSIS — Z12.11 SCREEN FOR COLON CANCER: ICD-10-CM

## 2019-02-06 DIAGNOSIS — I10 BENIGN ESSENTIAL HTN: ICD-10-CM

## 2019-02-06 DIAGNOSIS — E78.2 MIXED HYPERLIPIDEMIA: ICD-10-CM

## 2019-02-06 DIAGNOSIS — Z23 NEED FOR PNEUMOCOCCAL VACCINATION: ICD-10-CM

## 2019-02-06 DIAGNOSIS — Z00.00 HEALTHCARE MAINTENANCE: Primary | ICD-10-CM

## 2019-02-06 PROBLEM — J45.901 ASTHMATIC BRONCHITIS WITH ACUTE EXACERBATION: Status: RESOLVED | Noted: 2017-01-18 | Resolved: 2019-02-06

## 2019-02-06 PROCEDURE — 93000 ELECTROCARDIOGRAM COMPLETE: CPT | Performed by: NURSE PRACTITIONER

## 2019-02-06 PROCEDURE — 90670 PCV13 VACCINE IM: CPT | Performed by: NURSE PRACTITIONER

## 2019-02-06 PROCEDURE — G0009 ADMIN PNEUMOCOCCAL VACCINE: HCPCS | Performed by: NURSE PRACTITIONER

## 2019-02-06 PROCEDURE — 99396 PREV VISIT EST AGE 40-64: CPT | Performed by: NURSE PRACTITIONER

## 2019-02-06 NOTE — PROGRESS NOTES
Procedure     ECG 12 Lead  Date/Time: 2/6/2019 11:47 AM  Performed by: Kimber Johnson APRN  Authorized by: Kimber Johnson APRN   Comparison: compared with previous ECG from 2/6/2019  Similar to previous ECG  Rhythm: sinus rhythm  Rate: normal  BPM: 92  Conduction: conduction normal  Conduction comments: [place] 0.16 QRS 0.10  ST Segments: ST segments normal  T Waves: T waves normal  QRS axis: normal  Other: no other findings  Clinical impression: normal ECG

## 2019-02-06 NOTE — PROGRESS NOTES
"Annual Exam; Hypertension; Hyperlipidemia; and Asthma (PFT's twice yearly with Dr. Matthew)      Ash Licona is being seen for a Complete physical exam. His last physical was unknown .     Social: He is working full time at [employer]. He is  to [employer]. His household includes wife and Sheikh Retriever.     Lifestyle: He is not exercising regularly. He does not use tobacco. He drinks alcohol socially    Screening: Colonoscopy was completed never due to age.  Last labs reviewed from 2-1-2019 included a lipid panel, PSA, CBC, and CMP.     She has a history of HTN. He is currently taking losartan 100/25mg daily. He does not check his BP at home.     History of Present Illness       The following portions of the patient's history were reviewed and updated as appropriate: allergies, current medications, past family history, past medical history, past social history, past surgical history and problem list.    Review of Systems   Constitutional: Negative.    HENT: Negative.    Eyes: Negative.    Respiratory: Negative.    Cardiovascular: Negative.    Gastrointestinal: Negative.    Endocrine: Negative.    Genitourinary: Negative.    Musculoskeletal: Negative.    Allergic/Immunologic: Negative.  Negative for environmental allergies and food allergies.   Neurological: Negative.    Hematological: Negative.    Psychiatric/Behavioral: Negative.        Objective          /100 (BP Location: Left arm, Patient Position: Sitting, Cuff Size: Large Adult)   Pulse 83   Temp 98.4 °F (36.9 °C) (Oral)   Resp 16   Ht 182.9 cm (72\")   Wt 99.8 kg (220 lb)   SpO2 96%   BMI 29.84 kg/m²     General Appearance:    Alert, cooperative, no distress, appears stated age   Head:    Normocephalic, without obvious abnormality, atraumatic   Eyes:    PERRL, conjunctiva/corneas clear, EOM's intact, fundi     benign, both eyes        Ears:    Normal TM's and external ear canals, both ears   Nose:   Nares normal, septum midline, mucosa normal, no " drainage    or sinus tenderness   Throat:   Lips, mucosa, and tongue normal; teeth and gums normal   Neck:   Supple, symmetrical, trachea midline, no adenopathy;        thyroid:  No enlargement/tenderness/nodules; no carotid    bruit or JVD   Back:     Symmetric, no curvature, ROM normal, no CVA tenderness   Lungs:     Clear to auscultation bilaterally, respirations unlabored   Chest wall:    No tenderness or deformity   Heart:    Regular rate and rhythm, S1 and S2 normal, no murmur, rub    or gallop   Abdomen:     Soft, non-tender, bowel sounds active all four quadrants,     no masses, no organomegaly   Genitalia:    Normal male without lesion, discharge or tenderness   Rectal:    Normal tone, normal prostate, no masses or tenderness;   Extremities:   Extremities normal, atraumatic, no cyanosis or edema   Pulses:   2+ and symmetric all extremities   Skin:   Skin color, texture, turgor normal, no rashes or lesions   Lymph nodes:   Cervical, supraclavicular, and axillary nodes normal   Neurologic:   CNII-XII intact. Normal strength, sensation and reflexes      throughout              Assessment/Plan   Ash was seen today for annual exam, hypertension, hyperlipidemia and asthma.    Diagnoses and all orders for this visit:    Healthcare maintenance  -     ECG 12 Lead    Benign essential HTN  -     ECG 12 Lead  -     Comprehensive metabolic panel; Future  -     Conv Lipid Panel w/ Chol/HDL Ratio; Future    Mixed hyperlipidemia  -     ECG 12 Lead  -     Comprehensive metabolic panel; Future  -     Conv Lipid Panel w/ Chol/HDL Ratio; Future    Screen for colon cancer  -     Ambulatory Referral to Gastroenterology    Other orders  -     Cancel: Only Spirometry; Future      Weight loss goal pf 15 pounds. Will start an exercise regimen at home or with a  and use a meal servise for dinner.     Will check BP at home.     Follow up in 6 months with fasting labs

## 2019-02-11 ENCOUNTER — RESULTS ENCOUNTER (OUTPATIENT)
Dept: INTERNAL MEDICINE | Facility: CLINIC | Age: 49
End: 2019-02-11

## 2019-02-11 DIAGNOSIS — I10 BENIGN ESSENTIAL HTN: ICD-10-CM

## 2019-02-11 DIAGNOSIS — E78.2 MIXED HYPERLIPIDEMIA: ICD-10-CM

## 2019-05-09 ENCOUNTER — CLINICAL SUPPORT (OUTPATIENT)
Dept: INTERNAL MEDICINE | Facility: CLINIC | Age: 49
End: 2019-05-09

## 2019-05-09 DIAGNOSIS — Z23 IMMUNIZATION DUE: Primary | ICD-10-CM

## 2019-05-09 PROCEDURE — 90471 IMMUNIZATION ADMIN: CPT | Performed by: NURSE PRACTITIONER

## 2019-05-09 PROCEDURE — 90632 HEPA VACCINE ADULT IM: CPT | Performed by: NURSE PRACTITIONER

## 2019-07-30 LAB
ALBUMIN SERPL-MCNC: 4.4 G/DL (ref 3.5–5.2)
ALBUMIN/GLOB SERPL: 1.6 G/DL
ALP SERPL-CCNC: 50 U/L (ref 39–117)
ALT SERPL-CCNC: 28 U/L (ref 1–41)
AST SERPL-CCNC: 29 U/L (ref 1–40)
BILIRUB SERPL-MCNC: 0.6 MG/DL (ref 0.2–1.2)
BUN SERPL-MCNC: 9 MG/DL (ref 6–20)
BUN/CREAT SERPL: 11.5 (ref 7–25)
CALCIUM SERPL-MCNC: 9.2 MG/DL (ref 8.6–10.5)
CHLORIDE SERPL-SCNC: 96 MMOL/L (ref 98–107)
CHOLEST SERPL-MCNC: 190 MG/DL (ref 0–200)
CHOLEST/HDLC SERPL: 4.22 {RATIO}
CO2 SERPL-SCNC: 27.7 MMOL/L (ref 22–29)
CREAT SERPL-MCNC: 0.78 MG/DL (ref 0.76–1.27)
GLOBULIN SER CALC-MCNC: 2.8 GM/DL
GLUCOSE SERPL-MCNC: 104 MG/DL (ref 65–99)
HDLC SERPL-MCNC: 45 MG/DL (ref 40–60)
LDLC SERPL CALC-MCNC: 116 MG/DL (ref 0–100)
POTASSIUM SERPL-SCNC: 3.6 MMOL/L (ref 3.5–5.2)
PROT SERPL-MCNC: 7.2 G/DL (ref 6–8.5)
SODIUM SERPL-SCNC: 139 MMOL/L (ref 136–145)
TRIGL SERPL-MCNC: 146 MG/DL (ref 0–150)
VLDLC SERPL CALC-MCNC: 29.2 MG/DL

## 2019-08-06 ENCOUNTER — OFFICE VISIT (OUTPATIENT)
Dept: INTERNAL MEDICINE | Facility: CLINIC | Age: 49
End: 2019-08-06

## 2019-08-06 VITALS
SYSTOLIC BLOOD PRESSURE: 110 MMHG | RESPIRATION RATE: 16 BRPM | WEIGHT: 216 LBS | TEMPERATURE: 98.4 F | HEIGHT: 72 IN | HEART RATE: 75 BPM | DIASTOLIC BLOOD PRESSURE: 88 MMHG | OXYGEN SATURATION: 98 % | BODY MASS INDEX: 29.26 KG/M2

## 2019-08-06 DIAGNOSIS — Z12.5 SCREENING FOR PROSTATE CANCER: ICD-10-CM

## 2019-08-06 DIAGNOSIS — E78.2 MIXED HYPERLIPIDEMIA: ICD-10-CM

## 2019-08-06 DIAGNOSIS — I10 BENIGN ESSENTIAL HTN: Primary | ICD-10-CM

## 2019-08-06 PROCEDURE — 99213 OFFICE O/P EST LOW 20 MIN: CPT | Performed by: NURSE PRACTITIONER

## 2019-08-06 NOTE — PROGRESS NOTES
Chief Complaint   Patient presents with   • Follow-up   • Hypertension   • Hyperlipidemia       Subjective     Ash Licona is a 49 y.o. male being seen for a follow up appointment today regarding  HTN and hyperlipidemia. He recently changed jobs and is working for [employer]. He has been eating healthier and increasing exercise. Denies CP, SOA, edema. He is feeling well. No side effects of medications.       History of Present Illness     Allergies   Allergen Reactions   • Iodine Hives         Current Outpatient Medications:   •  ADVAIR DISKUS 100-50 MCG/DOSE DISKUS, INL 1 PUFF PO BID, Disp: , Rfl: 2  •  albuterol (PROVENTIL HFA;VENTOLIN HFA) 108 (90 BASE) MCG/ACT inhaler, Inhale 2 puffs Every 4 (Four) Hours As Needed for wheezing or shortness of air., Disp: , Rfl:   •  losartan-hydrochlorothiazide (HYZAAR) 100-25 MG per tablet, Take 1 tablet by mouth Daily., Disp: 90 tablet, Rfl: 3  •  montelukast (SINGULAIR) 10 MG tablet, Take  by mouth., Disp: , Rfl:     The following portions of the patient's history were reviewed and updated as appropriate: allergies, current medications, past family history, past medical history, past social history, past surgical history and problem list.    Review of Systems   Constitutional: Negative.    HENT: Negative.    Eyes: Negative.    Respiratory: Negative.    Cardiovascular: Negative.  Negative for chest pain, palpitations and leg swelling.   Gastrointestinal: Negative.    Endocrine: Negative.    Genitourinary: Negative.    Musculoskeletal: Negative.    Allergic/Immunologic: Negative.    Neurological: Negative.    Hematological: Negative.    Psychiatric/Behavioral: Negative.        Assessment     Physical Exam   Constitutional: He is oriented to person, place, and time. He appears well-developed and well-nourished. No distress.   HENT:   Head: Normocephalic.   Right Ear: External ear normal.   Left Ear: External ear normal.   Nose: Nose normal.   Mouth/Throat: Oropharynx is  clear and moist. No oropharyngeal exudate.   Neck: Neck supple. No thyromegaly present.   Cardiovascular: Normal rate, regular rhythm and normal heart sounds.   No murmur heard.  Pulmonary/Chest: Effort normal and breath sounds normal. No stridor. No respiratory distress. He has no wheezes.   Musculoskeletal: He exhibits no edema.   Neurological: He is alert and oriented to person, place, and time.   Psychiatric: He has a normal mood and affect. His behavior is normal.   Vitals reviewed.      Plan     His fasting labs were reviewed with the patient from last week.      Diagnosis Plan   1. Benign essential HTN  Comprehensive metabolic panel    Conv Lipid Panel w/ Chol/HDL Ratio    CBC & Differential   2. Mixed hyperlipidemia  Comprehensive metabolic panel    Conv Lipid Panel w/ Chol/HDL Ratio    CBC & Differential   3. Screening for prostate cancer  PSA SCREENING     Set up a CPE in 6 months

## 2019-08-11 ENCOUNTER — RESULTS ENCOUNTER (OUTPATIENT)
Dept: INTERNAL MEDICINE | Facility: CLINIC | Age: 49
End: 2019-08-11

## 2019-08-11 DIAGNOSIS — I10 BENIGN ESSENTIAL HTN: ICD-10-CM

## 2019-08-11 DIAGNOSIS — E78.2 MIXED HYPERLIPIDEMIA: ICD-10-CM

## 2019-08-11 DIAGNOSIS — Z12.5 SCREENING FOR PROSTATE CANCER: ICD-10-CM

## 2019-11-05 ENCOUNTER — RESULTS ENCOUNTER (OUTPATIENT)
Dept: INTERNAL MEDICINE | Facility: CLINIC | Age: 49
End: 2019-11-05

## 2019-11-05 DIAGNOSIS — I10 BENIGN ESSENTIAL HTN: ICD-10-CM

## 2019-11-05 DIAGNOSIS — E78.2 MIXED HYPERLIPIDEMIA: ICD-10-CM

## 2019-11-13 DIAGNOSIS — I10 BENIGN ESSENTIAL HTN: ICD-10-CM

## 2019-11-14 RX ORDER — LOSARTAN POTASSIUM AND HYDROCHLOROTHIAZIDE 25; 100 MG/1; MG/1
1 TABLET ORAL DAILY
Qty: 90 TABLET | Refills: 0 | Status: SHIPPED | OUTPATIENT
Start: 2019-11-14 | End: 2020-02-12

## 2020-02-04 DIAGNOSIS — E78.2 MIXED HYPERLIPIDEMIA: ICD-10-CM

## 2020-02-04 DIAGNOSIS — I10 BENIGN ESSENTIAL HTN: Primary | ICD-10-CM

## 2020-02-04 DIAGNOSIS — Z12.5 SCREENING FOR PROSTATE CANCER: ICD-10-CM

## 2020-02-05 ENCOUNTER — RESULTS ENCOUNTER (OUTPATIENT)
Dept: INTERNAL MEDICINE | Facility: CLINIC | Age: 50
End: 2020-02-05

## 2020-02-05 DIAGNOSIS — Z12.5 SCREENING FOR PROSTATE CANCER: ICD-10-CM

## 2020-02-05 DIAGNOSIS — E78.2 MIXED HYPERLIPIDEMIA: ICD-10-CM

## 2020-02-05 DIAGNOSIS — I10 BENIGN ESSENTIAL HTN: ICD-10-CM

## 2020-02-06 LAB
ALBUMIN SERPL-MCNC: 4.9 G/DL (ref 3.5–5.2)
ALBUMIN/GLOB SERPL: 1.8 G/DL
ALP SERPL-CCNC: 57 U/L (ref 39–117)
ALT SERPL-CCNC: 32 U/L (ref 1–41)
AST SERPL-CCNC: 27 U/L (ref 1–40)
BASOPHILS # BLD AUTO: 0.08 10*3/MM3 (ref 0–0.2)
BASOPHILS NFR BLD AUTO: 1.3 % (ref 0–1.5)
BILIRUB SERPL-MCNC: 0.5 MG/DL (ref 0.2–1.2)
BUN SERPL-MCNC: 11 MG/DL (ref 6–20)
BUN/CREAT SERPL: 14.5 (ref 7–25)
CALCIUM SERPL-MCNC: 9.8 MG/DL (ref 8.6–10.5)
CHLORIDE SERPL-SCNC: 94 MMOL/L (ref 98–107)
CHOLEST SERPL-MCNC: 205 MG/DL (ref 0–200)
CHOLEST/HDLC SERPL: 4.56 {RATIO}
CO2 SERPL-SCNC: 28.3 MMOL/L (ref 22–29)
CREAT SERPL-MCNC: 0.76 MG/DL (ref 0.76–1.27)
EOSINOPHIL # BLD AUTO: 0.14 10*3/MM3 (ref 0–0.4)
EOSINOPHIL NFR BLD AUTO: 2.2 % (ref 0.3–6.2)
ERYTHROCYTE [DISTWIDTH] IN BLOOD BY AUTOMATED COUNT: 13.6 % (ref 12.3–15.4)
GLOBULIN SER CALC-MCNC: 2.8 GM/DL
GLUCOSE SERPL-MCNC: 93 MG/DL (ref 65–99)
HCT VFR BLD AUTO: 49.6 % (ref 37.5–51)
HDLC SERPL-MCNC: 45 MG/DL (ref 40–60)
HGB BLD-MCNC: 16.5 G/DL (ref 13–17.7)
IMM GRANULOCYTES # BLD AUTO: 0.02 10*3/MM3 (ref 0–0.05)
IMM GRANULOCYTES NFR BLD AUTO: 0.3 % (ref 0–0.5)
LDLC SERPL CALC-MCNC: 110 MG/DL (ref 0–100)
LYMPHOCYTES # BLD AUTO: 2.66 10*3/MM3 (ref 0.7–3.1)
LYMPHOCYTES NFR BLD AUTO: 42.1 % (ref 19.6–45.3)
MCH RBC QN AUTO: 29.6 PG (ref 26.6–33)
MCHC RBC AUTO-ENTMCNC: 33.3 G/DL (ref 31.5–35.7)
MCV RBC AUTO: 88.9 FL (ref 79–97)
MONOCYTES # BLD AUTO: 0.3 10*3/MM3 (ref 0.1–0.9)
MONOCYTES NFR BLD AUTO: 4.7 % (ref 5–12)
NEUTROPHILS # BLD AUTO: 3.12 10*3/MM3 (ref 1.7–7)
NEUTROPHILS NFR BLD AUTO: 49.4 % (ref 42.7–76)
NRBC BLD AUTO-RTO: 0 /100 WBC (ref 0–0.2)
PLATELET # BLD AUTO: 302 10*3/MM3 (ref 140–450)
POTASSIUM SERPL-SCNC: 3.8 MMOL/L (ref 3.5–5.2)
PROT SERPL-MCNC: 7.7 G/DL (ref 6–8.5)
PSA SERPL-MCNC: 0.49 NG/ML (ref 0–4)
RBC # BLD AUTO: 5.58 10*6/MM3 (ref 4.14–5.8)
SODIUM SERPL-SCNC: 138 MMOL/L (ref 136–145)
TRIGL SERPL-MCNC: 252 MG/DL (ref 0–150)
VLDLC SERPL CALC-MCNC: 50.4 MG/DL
WBC # BLD AUTO: 6.32 10*3/MM3 (ref 3.4–10.8)

## 2020-02-12 ENCOUNTER — OFFICE VISIT (OUTPATIENT)
Dept: INTERNAL MEDICINE | Facility: CLINIC | Age: 50
End: 2020-02-12

## 2020-02-12 VITALS
WEIGHT: 220.6 LBS | HEART RATE: 92 BPM | SYSTOLIC BLOOD PRESSURE: 124 MMHG | OXYGEN SATURATION: 98 % | RESPIRATION RATE: 16 BRPM | HEIGHT: 72 IN | BODY MASS INDEX: 29.88 KG/M2 | DIASTOLIC BLOOD PRESSURE: 90 MMHG | TEMPERATURE: 98.5 F

## 2020-02-12 DIAGNOSIS — I10 ESSENTIAL HYPERTENSION: ICD-10-CM

## 2020-02-12 DIAGNOSIS — Z00.00 HEALTH CARE MAINTENANCE: Primary | ICD-10-CM

## 2020-02-12 DIAGNOSIS — R31.9 HEMATURIA OF UNKNOWN ETIOLOGY: ICD-10-CM

## 2020-02-12 DIAGNOSIS — I10 BENIGN ESSENTIAL HTN: ICD-10-CM

## 2020-02-12 LAB
BILIRUB BLD-MCNC: NEGATIVE MG/DL
CLARITY, POC: CLEAR
COLOR UR: YELLOW
GLUCOSE UR STRIP-MCNC: NEGATIVE MG/DL
KETONES UR QL: NEGATIVE
LEUKOCYTE EST, POC: NEGATIVE
NITRITE UR-MCNC: NEGATIVE MG/ML
PH UR: 6.5 [PH] (ref 5–8)
POC CREATININE URINE: NORMAL
POC MICROALBUMIN URINE: NORMAL
PROT UR STRIP-MCNC: NEGATIVE MG/DL
RBC # UR STRIP: ABNORMAL /UL
SP GR UR: 1.03 (ref 1–1.03)
UROBILINOGEN UR QL: NORMAL

## 2020-02-12 PROCEDURE — 81003 URINALYSIS AUTO W/O SCOPE: CPT | Performed by: NURSE PRACTITIONER

## 2020-02-12 PROCEDURE — 93000 ELECTROCARDIOGRAM COMPLETE: CPT | Performed by: NURSE PRACTITIONER

## 2020-02-12 PROCEDURE — 82044 UR ALBUMIN SEMIQUANTITATIVE: CPT | Performed by: NURSE PRACTITIONER

## 2020-02-12 PROCEDURE — 99396 PREV VISIT EST AGE 40-64: CPT | Performed by: NURSE PRACTITIONER

## 2020-02-12 RX ORDER — LOSARTAN POTASSIUM AND HYDROCHLOROTHIAZIDE 25; 100 MG/1; MG/1
1 TABLET ORAL DAILY
Qty: 90 TABLET | Refills: 0 | Status: SHIPPED | OUTPATIENT
Start: 2020-02-12 | End: 2020-05-18

## 2020-02-12 NOTE — PROGRESS NOTES
Procedure     ECG 12 Lead  Date/Time: 2/12/2020 8:19 AM  Performed by: Kimber Johnson APRN  Authorized by: Kimber Johnson APRN   Comparison: compared with previous ECG from 2/6/2019  Similar to previous ECG  Comparison to previous ECG: Previous ECG with ST, BBB not measuring any difference in QRS  Rhythm: sinus rhythm  Ectopy comments: None  Rate: normal  BPM: 71  Conduction: right bundle branch block  ST Segments: ST segments normal  T Waves: T waves normal  QRS axis: normal  Other: no other findings  Other findings comments: [place] 0.16 QRS 0.12    Clinical impression: normal ECG  Clinical impression comment: Right BBB

## 2020-02-12 NOTE — PROGRESS NOTES
"Annual Exam and Hypertension      Ash Licona is being seen for a Complete physical exam. His last physical was 2-6-2019.     Social: He is working full time at [employer]. He is . His household includes wife and Sheikh Retrievers (3). He has 3 children adult.     Lifestyle: He is not exercising regularly. He does not use tobacco. He drinks alcohol socially.    Screening: Colonoscopy was completed never due to age, he was referred to Dr. Davison but reports \"I procrastinated\" . Last labs reviewed from 2-5-2020 included a lipid panel, PSA, CBC, and CMP.       History of Present Illness       The following portions of the patient's history were reviewed and updated as appropriate: allergies, current medications, past family history, past medical history, past social history, past surgical history and problem list.    Review of Systems   Constitutional: Negative.    HENT: Negative.    Eyes: Negative.    Respiratory: Negative.  Negative for cough, choking, chest tightness, shortness of breath, wheezing and stridor.    Cardiovascular: Negative for chest pain, palpitations and leg swelling.   Gastrointestinal: Negative.  Negative for abdominal distention and abdominal pain.   Endocrine: Negative.    Musculoskeletal: Negative.    Allergic/Immunologic: Negative.    Neurological: Negative.    Hematological: Negative.  Negative for adenopathy. Does not bruise/bleed easily.   Psychiatric/Behavioral: Negative.  Negative for agitation and behavioral problems.   All other systems reviewed and are negative.      Objective          /90 (BP Location: Left arm, Patient Position: Sitting, Cuff Size: Adult)   Pulse 92   Temp 98.5 °F (36.9 °C) (Oral)   Resp 16   Ht 182.9 cm (72\")   Wt 100 kg (220 lb 9.6 oz)   SpO2 98%   BMI 29.92 kg/m²     General Appearance:    Alert, cooperative, no distress, appears stated age   Head:    Normocephalic, without obvious abnormality, atraumatic   Eyes:    PERRL, conjunctiva/corneas " clear, EOM's intact, fundi     benign, both eyes        Ears:    Normal TM's and external ear canals, both ears   Nose:   Nares normal, septum midline, mucosa normal, no drainage    or sinus tenderness   Throat:   Lips, mucosa, and tongue normal; teeth and gums normal   Neck:   Supple, symmetrical, trachea midline, no adenopathy;        thyroid:  No enlargement/tenderness/nodules; no carotid    bruit or JVD   Back:     Symmetric, no curvature, ROM normal, no CVA tenderness   Lungs:     Clear to auscultation bilaterally, respirations unlabored   Chest wall:    No tenderness or deformity   Heart:    Regular rate and rhythm, S1 and S2 normal, no murmur, rub    or gallop   Abdomen:     Soft, non-tender, bowel sounds active all four quadrants,     no masses, no organomegaly   Genitalia:    deferred   Rectal:    deferred   Extremities:   Extremities normal, atraumatic, no cyanosis or edema   Pulses:   2+ and symmetric all extremities   Skin:   Skin color, texture, turgor normal, no rashes or lesions   Lymph nodes:   Cervical, supraclavicular, and axillary nodes normal   Neurologic:   CNII-XII intact. Normal strength, sensation and reflexes      throughout              Assessment/Plan   Ash was seen today for annual exam and hypertension.    Diagnoses and all orders for this visit:    Health care maintenance    Essential hypertension  -     ECG 12 Lead    Hematuria of unknown etiology  -     POCT urinalysis dipstick, automated  -     POCT microalbumin  -     Urine Culture - Urine, Urine, Clean Catch; Future  -     US Renal Bilateral; Future        Preventive Counseling: He has the paperwork for colonoscopy. He has labs completed. Will evaluate the blood in urine with renal US. [employer] for weight lifting.     Follow up in 6 months

## 2020-02-14 LAB
BACTERIA UR CULT: NORMAL
BACTERIA UR CULT: NORMAL

## 2020-02-25 ENCOUNTER — HOSPITAL ENCOUNTER (OUTPATIENT)
Dept: ULTRASOUND IMAGING | Facility: HOSPITAL | Age: 50
Discharge: HOME | End: 2020-02-25
Admitting: NURSE PRACTITIONER

## 2020-02-25 DIAGNOSIS — R31.9 HEMATURIA OF UNKNOWN ETIOLOGY: ICD-10-CM

## 2020-02-25 PROCEDURE — 76775 US EXAM ABDO BACK WALL LIM: CPT

## 2020-05-16 DIAGNOSIS — I10 BENIGN ESSENTIAL HTN: ICD-10-CM

## 2020-05-18 RX ORDER — LOSARTAN POTASSIUM AND HYDROCHLOROTHIAZIDE 25; 100 MG/1; MG/1
1 TABLET ORAL DAILY
Qty: 90 TABLET | Refills: 0 | Status: SHIPPED | OUTPATIENT
Start: 2020-05-18 | End: 2020-06-17 | Stop reason: SDUPTHER

## 2020-06-17 DIAGNOSIS — I10 BENIGN ESSENTIAL HTN: ICD-10-CM

## 2020-06-17 RX ORDER — LOSARTAN POTASSIUM AND HYDROCHLOROTHIAZIDE 25; 100 MG/1; MG/1
1 TABLET ORAL DAILY
Qty: 90 TABLET | Refills: 0 | Status: SHIPPED | OUTPATIENT
Start: 2020-06-17 | End: 2020-11-10

## 2020-08-07 LAB
ALBUMIN SERPL-MCNC: 5 G/DL (ref 3.5–5.2)
ALBUMIN/GLOB SERPL: 2.5 G/DL
ALP SERPL-CCNC: 47 U/L (ref 39–117)
ALT SERPL-CCNC: 22 U/L (ref 1–41)
AST SERPL-CCNC: 22 U/L (ref 1–40)
BASOPHILS # BLD AUTO: 0.04 10*3/MM3 (ref 0–0.2)
BASOPHILS NFR BLD AUTO: 0.9 % (ref 0–1.5)
BILIRUB SERPL-MCNC: 0.8 MG/DL (ref 0–1.2)
BUN SERPL-MCNC: 12 MG/DL (ref 6–20)
BUN/CREAT SERPL: 15.6 (ref 7–25)
CALCIUM SERPL-MCNC: 9.4 MG/DL (ref 8.6–10.5)
CHLORIDE SERPL-SCNC: 95 MMOL/L (ref 98–107)
CHOLEST SERPL-MCNC: 161 MG/DL (ref 0–200)
CO2 SERPL-SCNC: 26.3 MMOL/L (ref 22–29)
CREAT SERPL-MCNC: 0.77 MG/DL (ref 0.76–1.27)
EOSINOPHIL # BLD AUTO: 0.11 10*3/MM3 (ref 0–0.4)
EOSINOPHIL NFR BLD AUTO: 2.5 % (ref 0.3–6.2)
ERYTHROCYTE [DISTWIDTH] IN BLOOD BY AUTOMATED COUNT: 12.9 % (ref 12.3–15.4)
GLOBULIN SER CALC-MCNC: 2 GM/DL
GLUCOSE SERPL-MCNC: 94 MG/DL (ref 65–99)
HCT VFR BLD AUTO: 46.6 % (ref 37.5–51)
HDLC SERPL-MCNC: 44 MG/DL (ref 40–60)
HGB BLD-MCNC: 16 G/DL (ref 13–17.7)
IMM GRANULOCYTES # BLD AUTO: 0.01 10*3/MM3 (ref 0–0.05)
IMM GRANULOCYTES NFR BLD AUTO: 0.2 % (ref 0–0.5)
LDLC SERPL CALC-MCNC: 101 MG/DL (ref 0–100)
LDLC/HDLC SERPL: 2.29 {RATIO}
LYMPHOCYTES # BLD AUTO: 1.68 10*3/MM3 (ref 0.7–3.1)
LYMPHOCYTES NFR BLD AUTO: 38.3 % (ref 19.6–45.3)
MCH RBC QN AUTO: 30 PG (ref 26.6–33)
MCHC RBC AUTO-ENTMCNC: 34.3 G/DL (ref 31.5–35.7)
MCV RBC AUTO: 87.3 FL (ref 79–97)
MONOCYTES # BLD AUTO: 0.28 10*3/MM3 (ref 0.1–0.9)
MONOCYTES NFR BLD AUTO: 6.4 % (ref 5–12)
NEUTROPHILS # BLD AUTO: 2.27 10*3/MM3 (ref 1.7–7)
NEUTROPHILS NFR BLD AUTO: 51.7 % (ref 42.7–76)
NRBC BLD AUTO-RTO: 0 /100 WBC (ref 0–0.2)
PLATELET # BLD AUTO: 277 10*3/MM3 (ref 140–450)
POTASSIUM SERPL-SCNC: 3.8 MMOL/L (ref 3.5–5.2)
PROT SERPL-MCNC: 7 G/DL (ref 6–8.5)
PSA SERPL-MCNC: 0.52 NG/ML (ref 0–4)
RBC # BLD AUTO: 5.34 10*6/MM3 (ref 4.14–5.8)
SODIUM SERPL-SCNC: 134 MMOL/L (ref 136–145)
TRIGL SERPL-MCNC: 81 MG/DL (ref 0–150)
VLDLC SERPL CALC-MCNC: 16.2 MG/DL
WBC # BLD AUTO: 4.39 10*3/MM3 (ref 3.4–10.8)

## 2020-08-12 ENCOUNTER — OFFICE VISIT (OUTPATIENT)
Dept: INTERNAL MEDICINE | Facility: CLINIC | Age: 50
End: 2020-08-12

## 2020-08-12 VITALS
HEIGHT: 72 IN | DIASTOLIC BLOOD PRESSURE: 84 MMHG | TEMPERATURE: 98 F | SYSTOLIC BLOOD PRESSURE: 126 MMHG | HEART RATE: 73 BPM | RESPIRATION RATE: 18 BRPM | WEIGHT: 205 LBS | BODY MASS INDEX: 27.77 KG/M2 | OXYGEN SATURATION: 99 %

## 2020-08-12 DIAGNOSIS — I10 ESSENTIAL HYPERTENSION: Primary | ICD-10-CM

## 2020-08-12 DIAGNOSIS — Z11.59 NEED FOR HEPATITIS C SCREENING TEST: ICD-10-CM

## 2020-08-12 DIAGNOSIS — E78.2 MIXED HYPERLIPIDEMIA: ICD-10-CM

## 2020-08-12 PROCEDURE — 99213 OFFICE O/P EST LOW 20 MIN: CPT | Performed by: NURSE PRACTITIONER

## 2020-08-12 NOTE — PROGRESS NOTES
"Chief Complaint   Patient presents with   • Follow-up     lab results       Subjective     Ash Licona is a 50 y.o. male being seen for a follow up appointment today regarding HTN and hyperlipdemia. He was in the office 6 months ago for a CPE.  His cholesterol and BP were not well controlled, so he went on a diet. He went on a low carb and \"smoothie\" diet. He is also walking daily at [facility], gets 14,000-16,000 steps a day.       History of Present Illness     Allergies   Allergen Reactions   • Iodine Hives         Current Outpatient Medications:   •  ADVAIR DISKUS 100-50 MCG/DOSE DISKUS, INL 1 PUFF PO BID, Disp: , Rfl: 2  •  albuterol (PROVENTIL HFA;VENTOLIN HFA) 108 (90 BASE) MCG/ACT inhaler, Inhale 2 puffs Every 4 (Four) Hours As Needed for wheezing or shortness of air., Disp: , Rfl:   •  losartan-hydrochlorothiazide (HYZAAR) 100-25 MG per tablet, Take 1 tablet by mouth Daily., Disp: 90 tablet, Rfl: 0  •  montelukast (SINGULAIR) 10 MG tablet, Take  by mouth., Disp: , Rfl:     The following portions of the patient's history were reviewed and updated as appropriate: allergies, current medications, past family history, past medical history, past social history, past surgical history and problem list.    Review of Systems   Constitutional: Negative.    HENT: Negative.    Eyes: Negative.    Respiratory: Negative.  Negative for shortness of breath and stridor.    Cardiovascular: Negative.    Gastrointestinal: Negative.    Endocrine: Negative.    Genitourinary: Negative.    Musculoskeletal: Negative.    Skin: Negative.    Allergic/Immunologic: Negative.    Neurological: Negative.    Hematological: Negative.    Psychiatric/Behavioral: Negative.    All other systems reviewed and are negative.      Assessment     Physical Exam   Constitutional: He is oriented to person, place, and time. He appears well-developed and well-nourished.   Cardiovascular: Normal rate, regular rhythm and normal heart sounds.   No " murmur heard.  Pulmonary/Chest: Effort normal and breath sounds normal. No stridor. No respiratory distress.   Musculoskeletal: He exhibits no edema.   Neurological: He is alert and oriented to person, place, and time.   Psychiatric: He has a normal mood and affect. His behavior is normal.   Vitals reviewed.      Plan     His fasting labs were reviewed with the patient from last week.     Ash was seen today for follow-up.    Diagnoses and all orders for this visit:    Essential hypertension  -     Comprehensive metabolic panel; Future  -     Conv Lipid Panel w/ Chol/HDL Ratio; Future    Mixed hyperlipidemia  -     Comprehensive metabolic panel; Future  -     Conv Lipid Panel w/ Chol/HDL Ratio; Future    Need for hepatitis C screening test  -     Hepatitis C Antibody        Set up a CPE in 6 months

## 2020-08-13 LAB
HCV AB S/CO SERPL IA: <0.1 S/CO RATIO (ref 0–0.9)
Lab: NORMAL
WRITTEN AUTHORIZATION: NORMAL

## 2020-08-17 ENCOUNTER — RESULTS ENCOUNTER (OUTPATIENT)
Dept: INTERNAL MEDICINE | Facility: CLINIC | Age: 50
End: 2020-08-17

## 2020-08-17 ENCOUNTER — TELEPHONE (OUTPATIENT)
Dept: INTERNAL MEDICINE | Facility: CLINIC | Age: 50
End: 2020-08-17

## 2020-08-17 DIAGNOSIS — I10 ESSENTIAL HYPERTENSION: ICD-10-CM

## 2020-08-17 DIAGNOSIS — E78.2 MIXED HYPERLIPIDEMIA: ICD-10-CM

## 2020-08-17 NOTE — TELEPHONE ENCOUNTER
LM on patient VM that HepC result was negative.  Advised that Shingles vaccine only available at his pharmacy and that he does not need RX for this.

## 2020-08-17 NOTE — TELEPHONE ENCOUNTER
Patient sent [employer] message to set up an appointment to address Hep C Screening, Influenza and Zoster vaccine.  Reply sent to patient stating the Influenza would not be available until September/October; the Hep C Screening was done with his August 7th labs.  Please advise patient on Zoster vaccine.

## 2020-11-10 DIAGNOSIS — I10 BENIGN ESSENTIAL HTN: ICD-10-CM

## 2020-11-10 RX ORDER — LOSARTAN POTASSIUM AND HYDROCHLOROTHIAZIDE 25; 100 MG/1; MG/1
1 TABLET ORAL DAILY
Qty: 90 TABLET | Refills: 0 | Status: SHIPPED | OUTPATIENT
Start: 2020-11-10 | End: 2020-12-21 | Stop reason: SDUPTHER

## 2020-12-21 DIAGNOSIS — I10 BENIGN ESSENTIAL HTN: ICD-10-CM

## 2020-12-21 RX ORDER — LOSARTAN POTASSIUM AND HYDROCHLOROTHIAZIDE 25; 100 MG/1; MG/1
1 TABLET ORAL DAILY
Qty: 90 TABLET | Refills: 0 | Status: SHIPPED | OUTPATIENT
Start: 2020-12-21 | End: 2021-03-22

## 2021-02-06 LAB
ALBUMIN SERPL-MCNC: 4.7 G/DL (ref 3.5–5.2)
ALBUMIN/GLOB SERPL: 1.7 G/DL
ALP SERPL-CCNC: 49 U/L (ref 39–117)
ALT SERPL-CCNC: 23 U/L (ref 1–41)
AST SERPL-CCNC: 24 U/L (ref 1–40)
BILIRUB SERPL-MCNC: 0.6 MG/DL (ref 0–1.2)
BUN SERPL-MCNC: 13 MG/DL (ref 6–20)
BUN/CREAT SERPL: 17.1 (ref 7–25)
CALCIUM SERPL-MCNC: 9.6 MG/DL (ref 8.6–10.5)
CHLORIDE SERPL-SCNC: 97 MMOL/L (ref 98–107)
CHOLEST SERPL-MCNC: 155 MG/DL (ref 0–200)
CHOLEST/HDLC SERPL: 3.37 {RATIO}
CO2 SERPL-SCNC: 29.1 MMOL/L (ref 22–29)
CREAT SERPL-MCNC: 0.76 MG/DL (ref 0.76–1.27)
GLOBULIN SER CALC-MCNC: 2.8 GM/DL
GLUCOSE SERPL-MCNC: 94 MG/DL (ref 65–99)
HCV AB S/CO SERPL IA: 0.1 S/CO RATIO (ref 0–0.9)
HDLC SERPL-MCNC: 46 MG/DL (ref 40–60)
LDLC SERPL CALC-MCNC: 98 MG/DL (ref 0–100)
POTASSIUM SERPL-SCNC: 4.3 MMOL/L (ref 3.5–5.2)
PROT SERPL-MCNC: 7.5 G/DL (ref 6–8.5)
SODIUM SERPL-SCNC: 137 MMOL/L (ref 136–145)
TRIGL SERPL-MCNC: 54 MG/DL (ref 0–150)
VLDLC SERPL CALC-MCNC: 11 MG/DL (ref 5–40)

## 2021-02-11 ENCOUNTER — OFFICE VISIT (OUTPATIENT)
Dept: INTERNAL MEDICINE | Facility: CLINIC | Age: 51
End: 2021-02-11

## 2021-02-11 VITALS
SYSTOLIC BLOOD PRESSURE: 124 MMHG | HEART RATE: 68 BPM | BODY MASS INDEX: 27.33 KG/M2 | WEIGHT: 201.8 LBS | DIASTOLIC BLOOD PRESSURE: 72 MMHG | TEMPERATURE: 96.8 F | RESPIRATION RATE: 18 BRPM | HEIGHT: 72 IN | OXYGEN SATURATION: 98 %

## 2021-02-11 DIAGNOSIS — Z00.00 HEALTHCARE MAINTENANCE: Primary | ICD-10-CM

## 2021-02-11 DIAGNOSIS — Z23 NEED FOR PNEUMOCOCCAL VACCINATION: ICD-10-CM

## 2021-02-11 DIAGNOSIS — E78.2 MIXED HYPERLIPIDEMIA: ICD-10-CM

## 2021-02-11 DIAGNOSIS — I10 ESSENTIAL HYPERTENSION: ICD-10-CM

## 2021-02-11 DIAGNOSIS — Z12.11 SCREEN FOR COLON CANCER: ICD-10-CM

## 2021-02-11 PROCEDURE — 99396 PREV VISIT EST AGE 40-64: CPT | Performed by: NURSE PRACTITIONER

## 2021-02-11 PROCEDURE — 90471 IMMUNIZATION ADMIN: CPT | Performed by: NURSE PRACTITIONER

## 2021-02-11 PROCEDURE — 90732 PPSV23 VACC 2 YRS+ SUBQ/IM: CPT | Performed by: NURSE PRACTITIONER

## 2021-02-11 PROCEDURE — 93000 ELECTROCARDIOGRAM COMPLETE: CPT | Performed by: NURSE PRACTITIONER

## 2021-02-11 NOTE — PROGRESS NOTES
Procedure     ECG 12 Lead    Date/Time: 2/11/2021 3:07 PM  Performed by: Kimber Johnson APRN  Authorized by: Kimber Johnson APRN   Comparison: compared with previous ECG from 2/12/2020  Similar to previous ECG  Rhythm: sinus rhythm  Ectopy comments: none  Rate: normal  BPM: 68  Conduction: conduction normal  Conduction comments: MN 0.16 QRS 0.12  ST Segments: ST segments normal  T Waves: T waves normal  QRS axis: normal  Other: no other findings    Clinical impression: normal ECG

## 2021-02-11 NOTE — PROGRESS NOTES
"COLEMAN Licona is being seen for a Complete physical exam. His last physical was 2-     Social: He is working full time at [employer]. He is . His household includes wife.     Lifestyle: He is not exercising regularly, walking the dogs regualrly. He does not  use tobacco. He drinks alcohol socially.    Screening: Colonoscopy was completed never, referred tp GI last year Fecal OB completed never. Last labs reviewed from 2-5-2021 included a lipid panel,  CBC, and CMP.       History of Present Illness       The following portions of the patient's history were reviewed and updated as appropriate: allergies, current medications, past family history, past medical history, past social history, past surgical history and problem list.    Review of Systems   Constitutional: Negative.    HENT: Negative.    Eyes: Negative.    Respiratory: Negative.    Cardiovascular: Negative.    Endocrine: Negative.    Genitourinary: Negative.    Allergic/Immunologic: Negative.    Neurological: Negative.    Hematological: Negative.    Psychiatric/Behavioral: Negative.    All other systems reviewed and are negative.      Objective          /72 (BP Location: Left arm, Patient Position: Sitting, Cuff Size: Adult)   Pulse 68   Temp 96.8 °F (36 °C) (Temporal)   Resp 18   Ht 182.9 cm (72.01\")   Wt 91.5 kg (201 lb 12.8 oz)   SpO2 98%   BMI 27.36 kg/m²     General Appearance:    Alert, cooperative, no distress, appears stated age   Head:    Normocephalic, without obvious abnormality, atraumatic   Eyes:    PERRL, conjunctiva/corneas clear, EOM's intact, fundi     benign, both eyes        Ears:    Normal TM's and external ear canals, both ears   Nose:   Nares normal, septum midline, mucosa normal, no drainage    or sinus tenderness   Throat:   Lips, mucosa, and tongue normal; teeth and gums normal   Neck:   Supple, symmetrical, trachea midline, no adenopathy;        thyroid:  No enlargement/tenderness/nodules; no carotid    " bruit or JVD   Back:     Symmetric, no curvature, ROM normal, no CVA tenderness   Lungs:     Clear to auscultation bilaterally, respirations unlabored   Chest wall:    No tenderness or deformity   Heart:    Regular rate and rhythm, S1 and S2 normal, no murmur, rub    or gallop   Abdomen:     Soft, non-tender, bowel sounds active all four quadrants,     no masses, no organomegaly   Genitalia:    Normal male without lesion, discharge or tenderness   Rectal:    Normal tone, normal prostate, no masses or tenderness;   Extremities:   Extremities normal, atraumatic, no cyanosis or edema   Pulses:   2+ and symmetric all extremities   Skin:   Skin color, texture, turgor normal, no rashes or lesions   Lymph nodes:   Cervical, supraclavicular, and axillary nodes normal   Neurologic:   CNII-XII intact. Normal strength, sensation and reflexes      throughout              Assessment/Plan   Diagnoses and all orders for this visit:    1. Healthcare maintenance (Primary)  -     ECG 12 Lead    2. Screen for colon cancer  -     Ambulatory Referral to Gastroenterology    3. Need for pneumococcal vaccination  -     Pneumococcal Polysaccharide Vaccine 23-Valent (PPSV23) Greater Than or Equal To 3yo Subcutaneous / IM    4. Essential hypertension  -     ECG 12 Lead    5. Mixed hyperlipidemia  -     ECG 12 Lead    Preventive counsleing: Reviewed recommended vaccines based on age and health conditions. Colon cancer screening needed. BP at goal. LDL at goal.     Follow up in 6 months with labs

## 2021-03-20 DIAGNOSIS — I10 BENIGN ESSENTIAL HTN: ICD-10-CM

## 2021-03-22 RX ORDER — LOSARTAN POTASSIUM AND HYDROCHLOROTHIAZIDE 25; 100 MG/1; MG/1
1 TABLET ORAL DAILY
Qty: 90 TABLET | Refills: 0 | Status: SHIPPED | OUTPATIENT
Start: 2021-03-22 | End: 2021-05-11 | Stop reason: SDUPTHER

## 2021-05-11 DIAGNOSIS — I10 BENIGN ESSENTIAL HTN: ICD-10-CM

## 2021-05-12 RX ORDER — LOSARTAN POTASSIUM AND HYDROCHLOROTHIAZIDE 25; 100 MG/1; MG/1
1 TABLET ORAL DAILY
Qty: 90 TABLET | Refills: 1 | Status: SHIPPED | OUTPATIENT
Start: 2021-05-12 | End: 2021-11-05

## 2021-07-12 ENCOUNTER — NURSE TRIAGE (OUTPATIENT)
Dept: CALL CENTER | Facility: HOSPITAL | Age: 51
End: 2021-07-12

## 2021-07-12 ENCOUNTER — OFFICE VISIT (OUTPATIENT)
Dept: INTERNAL MEDICINE | Facility: CLINIC | Age: 51
End: 2021-07-12

## 2021-07-12 ENCOUNTER — HOSPITAL ENCOUNTER (OUTPATIENT)
Dept: GENERAL RADIOLOGY | Facility: HOSPITAL | Age: 51
Discharge: HOME | End: 2021-07-12
Admitting: INTERNAL MEDICINE

## 2021-07-12 VITALS
WEIGHT: 201.2 LBS | DIASTOLIC BLOOD PRESSURE: 82 MMHG | HEIGHT: 72 IN | BODY MASS INDEX: 27.25 KG/M2 | TEMPERATURE: 97.1 F | HEART RATE: 68 BPM | RESPIRATION RATE: 18 BRPM | SYSTOLIC BLOOD PRESSURE: 132 MMHG | OXYGEN SATURATION: 98 %

## 2021-07-12 DIAGNOSIS — S29.011A CHEST WALL MUSCLE STRAIN, INITIAL ENCOUNTER: Primary | ICD-10-CM

## 2021-07-12 DIAGNOSIS — R07.81 RIB PAIN ON LEFT SIDE: ICD-10-CM

## 2021-07-12 PROCEDURE — 71101 X-RAY EXAM UNILAT RIBS/CHEST: CPT

## 2021-07-12 PROCEDURE — 99213 OFFICE O/P EST LOW 20 MIN: CPT | Performed by: INTERNAL MEDICINE

## 2021-07-12 RX ORDER — MELOXICAM 7.5 MG/1
7.5 TABLET ORAL DAILY
Qty: 14 TABLET | Refills: 0 | Status: SHIPPED | OUTPATIENT
Start: 2021-07-12 | End: 2021-08-11

## 2021-07-12 NOTE — TELEPHONE ENCOUNTER
"Pt did not experience blunt force trauma to his chest, he twisted a way and felt a snap and pop in his left chest.  Pain 8 since 7/9. I do feel he at the least needs a chest xray.  Office was not available when [facility] tried to connect him.  He will try back in few minutes to see if they would order the xray, he does not want to pay for ER co pay. Will seek urgent care if unable to speak with office.     Reason for Disposition  • [1] MODERATE pain (e.g., interferes with normal activities) AND [2] pain is WORSE with breathing    Additional Information  • Negative: Sounds like a life-threatening emergency to the triager  • Negative: Chest Injury from a direct blow or fall  • Negative: Chest injury knife, gun shot, or other penetrating trauma  • Negative: Chest injury with cut or laceration  • Negative: Chest pain not from an injury  • Negative: Sounds like a serious injury to the triager  • Negative: SEVERE chest or rib pain (e.g., excruciating, unable to do any normal activities)    Answer Assessment - Initial Assessment Questions  1. MECHANISM: \"How did the injury happen?\"      Was bent at odd angle while working on bike, torqued a bolt and felt a snap in his left chest with severe sharp pain.  Can breath fine at rest but if exert himself them feels SOA and has pain with deep breathing.    2. ONSET: \"When did the injury happen?\" (e.g., minutes, hours, days ago)      7/9  3. LOCATION: \"Where on the chest is the injury located?\" \"Where does it hurt?\"      Left chest  4. CHEST OR RIB PAIN SEVERITY: \"How bad is the pain?\"  (e.g., Scale 1-10; mild, moderate, or severe)     - MILD (1-3): doesn't interfere with normal activities      - MODERATE (4-7): interferes with normal activities or awakens from sleep     - SEVERE (8-10): excruciating pain, unable to do any normal activities        8  5. BREATHING DIFFICULTY: \"Are you having any difficulty breathing?\" If Yes, ask: \"How bad is it?\"  (e.g., none, mild, moderate, severe) " "  6: OTHER SYMPTOMS (e.g., cough, fever, rash)       Hurts to take deep breaths, laying on back makes if better and avoids movements that hurt  7. PREGNANCY: \"Is there any chance you are pregnant?\" \"When was your last menstrual period?\"      NA    Protocols used: CHEST INJURY - BENDING, LIFTING, OR TWISTING-ADULT-AH      "

## 2021-07-12 NOTE — PROGRESS NOTES
"      Ash Licona is a 51 y.o. male, who presents with a chief complaint of   Chief Complaint   Patient presents with   • Rib Pain       HPI     Ash presents for concern for rib pain. Bought a new bike recently, was working on it 3 days ago when he accidentally suffered an injury while trying to un-torque a pedal in his bike. Was leaning over his bike with the ratchet in his hand & noticed a \"pop\" localized to his left anterior chest and since then has noticed progressive pain at the same site. No direct injury or trauma from tools.  Exacerbated with deep inspiration, getting in and out of bed and used to hurt with lateral motion but that has potentially improved. Tries to stay really active but unable to bike or go on a hike as planned. Advil did seem to help quite a bit. Hasn't tried ice yet.     The following portions of the patient's history were reviewed and updated as appropriate: allergies, current medications, past family history, past medical history, past social history, past surgical history and problem list.    Allergies: Iodine    Current Outpatient Medications:   •  ADVAIR DISKUS 100-50 MCG/DOSE DISKUS, INL 1 PUFF PO BID, Disp: , Rfl: 2  •  albuterol (PROVENTIL HFA;VENTOLIN HFA) 108 (90 BASE) MCG/ACT inhaler, Inhale 2 puffs Every 4 (Four) Hours As Needed for wheezing or shortness of air., Disp: , Rfl:   •  losartan-hydrochlorothiazide (HYZAAR) 100-25 MG per tablet, Take 1 tablet by mouth Daily., Disp: 90 tablet, Rfl: 1  •  montelukast (SINGULAIR) 10 MG tablet, Take  by mouth., Disp: , Rfl:   •  meloxicam (Mobic) 7.5 MG tablet, Take 1 tablet by mouth Daily., Disp: 14 tablet, Rfl: 0  There are no discontinued medications.    Review of Systems   Constitutional: Negative for activity change and appetite change.   HENT: Negative for rhinorrhea, sinus pressure, sinus pain and voice change.    Respiratory: Negative for cough and shortness of breath.    Cardiovascular: Negative for chest pain. " "  Gastrointestinal: Negative for abdominal distention and abdominal pain.   Musculoskeletal:        Chest wall pain    Neurological: Negative for dizziness and headaches.   Hematological: Negative for adenopathy. Does not bruise/bleed easily.             /82 (BP Location: Left arm, Patient Position: Sitting, Cuff Size: Large Adult)   Pulse 68   Temp 97.1 °F (36.2 °C) (Temporal)   Resp 18   Ht 182.9 cm (72.01\")   Wt 91.3 kg (201 lb 3.2 oz)   SpO2 98%   BMI 27.28 kg/m²       Physical Exam  Vitals and nursing note reviewed.   Constitutional:       Appearance: Normal appearance. He is normal weight.   HENT:      Head: Normocephalic and atraumatic.      Right Ear: External ear normal.      Left Ear: External ear normal.      Nose: Nose normal.      Mouth/Throat:      Mouth: Mucous membranes are moist.   Eyes:      Conjunctiva/sclera: Conjunctivae normal.      Pupils: Pupils are equal, round, and reactive to light.   Cardiovascular:      Rate and Rhythm: Normal rate and regular rhythm.      Pulses: Normal pulses.      Heart sounds: Normal heart sounds. No murmur heard.   No gallop.    Musculoskeletal:         General: Tenderness (Pain with palpation to left pectoral region, pain with resisted chest press) present. Normal range of motion.      Cervical back: Normal range of motion. No rigidity.      Comments: ttp of left rib under nipple   Lymphadenopathy:      Cervical: No cervical adenopathy.   Skin:     General: Skin is warm.      Capillary Refill: Capillary refill takes less than 2 seconds.   Neurological:      General: No focal deficit present.      Mental Status: He is alert.   Psychiatric:         Mood and Affect: Mood normal.         Behavior: Behavior normal.         Lab Results (most recent)     None          Results for orders placed or performed during the hospital encounter of 03/24/21   COVID-19,[facility] ROUTINE, NP/OP SWAB IN TRANSPORT MEDIA OR ESWAB 72 HR TAT - Swab, Nasopharynx    Specimen: " Nasopharynx; Swab   Result Value Ref Range    SARS-CoV-2, BIN Not Detected Not Detected   COVID [facility] Priority - Swab, Nasopharynx    Specimen: Nasopharynx; Swab   Result Value Ref Range    COVID [facility] PRIORITY Comment    SARS-CoV-2, BIN 2 DAY TAT - Swab, Nasopharynx    Specimen: Nasopharynx; Swab   Result Value Ref Range    [facility] SARS-COV-2, BIN 2 DAY TAT Performed            Diagnoses and all orders for this visit:    1. Chest wall muscle strain, initial encounter (Primary)  -     meloxicam (Mobic) 7.5 MG tablet; Take 1 tablet by mouth Daily.  Dispense: 14 tablet; Refill: 0    2. Rib pain on left side  -     XR Ribs Left With PA Chest; Future  -     meloxicam (Mobic) 7.5 MG tablet; Take 1 tablet by mouth Daily.  Dispense: 14 tablet; Refill: 0    Seen following acute chest wall strain, potentially partial tear or strain of pectoral muscle. Will treat with 14 days of anti-inflammatory regimen including meloxicam, ice and rest of involved muscle groups. Ok to exercise but avoid high-risk activities.   + ttp of rib so will check imaging to evaluate for possible rib fracture.     Return if symptoms worsen or fail to improve.    Rl Borrego MD  Internal Medicine-Pediatrics, PGY-4    I have seen and examined the patient independently.  Agree with above.

## 2021-07-12 NOTE — PATIENT INSTRUCTIONS
Chest Wall Pain  Chest wall pain is pain in or around the bones and muscles of your chest. Sometimes, an injury causes this pain. Excessive coughing or overuse of arm and chest muscles may also cause chest wall pain. Sometimes, the cause may not be known. This pain may take several weeks or longer to get better.  Follow these instructions at home:  Managing pain, stiffness, and swelling    · If directed, put ice on the painful area:  ? Put ice in a plastic bag.  ? Place a towel between your skin and the bag.  ? Leave the ice on for 20 minutes, 2-3 times per day.  Activity  · Rest as told by your health care provider.  · Avoid activities that cause pain. These include any activities that use your chest muscles or your abdominal and side muscles to lift heavy items. Ask your health care provider what activities are safe for you.  General instructions    · Take over-the-counter and prescription medicines only as told by your health care provider.  · Do not use any products that contain nicotine or tobacco, such as cigarettes, e-cigarettes, and chewing tobacco. These can delay healing after injury. If you need help quitting, ask your health care provider.  · Keep all follow-up visits as told by your health care provider. This is important.  Contact a health care provider if:  · You have a fever.  · Your chest pain becomes worse.  · You have new symptoms.  Get help right away if:  · You have nausea or vomiting.  · You feel sweaty or light-headed.  · You have a cough with mucus from your lungs (sputum) or you cough up blood.  · You develop shortness of breath.  These symptoms may represent a serious problem that is an emergency. Do not wait to see if the symptoms will go away. Get medical help right away. Call your local emergency services (911 in the [place] Do not drive yourself to the hospital.  Summary  · Chest wall pain is pain in or around the bones and muscles of your chest.  · Depending on the cause, it may be  treated with ice, rest, medicines, and avoiding activities that cause pain.  · Contact a health care provider if you have a fever, worsening chest pain, or new symptoms.  · Get help right away if you feel light-headed or you develop shortness of breath. These symptoms may be an emergency.  This information is not intended to replace advice given to you by your health care provider. Make sure you discuss any questions you have with your health care provider.  Document Revised: 06/20/2019 Document Reviewed: 06/20/2019  Elsevier Patient Education © 2021 [employer] Inc[employer]

## 2021-07-29 ENCOUNTER — TELEPHONE (OUTPATIENT)
Dept: INTERNAL MEDICINE | Facility: CLINIC | Age: 51
End: 2021-07-29

## 2021-08-02 ENCOUNTER — TELEPHONE (OUTPATIENT)
Dept: GASTROENTEROLOGY | Facility: CLINIC | Age: 51
End: 2021-08-02

## 2021-08-02 DIAGNOSIS — Z12.5 SCREENING FOR PROSTATE CANCER: ICD-10-CM

## 2021-08-02 DIAGNOSIS — E78.2 MIXED HYPERLIPIDEMIA: ICD-10-CM

## 2021-08-02 DIAGNOSIS — I10 ESSENTIAL HYPERTENSION: Primary | ICD-10-CM

## 2021-08-02 PROBLEM — Z11.59 NEED FOR HEPATITIS C SCREENING TEST: Status: RESOLVED | Noted: 2020-08-12 | Resolved: 2021-08-02

## 2021-08-10 ENCOUNTER — PREP FOR SURGERY (OUTPATIENT)
Dept: OTHER | Facility: HOSPITAL | Age: 51
End: 2021-08-10

## 2021-08-10 DIAGNOSIS — Z12.11 ENCOUNTER FOR SCREENING FOR MALIGNANT NEOPLASM OF COLON: Primary | ICD-10-CM

## 2021-08-11 ENCOUNTER — OFFICE VISIT (OUTPATIENT)
Dept: INTERNAL MEDICINE | Facility: CLINIC | Age: 51
End: 2021-08-11

## 2021-08-11 ENCOUNTER — HOSPITAL ENCOUNTER (OUTPATIENT)
Dept: GENERAL RADIOLOGY | Facility: HOSPITAL | Age: 51
Discharge: HOME | End: 2021-08-11
Admitting: NURSE PRACTITIONER

## 2021-08-11 VITALS
RESPIRATION RATE: 16 BRPM | OXYGEN SATURATION: 98 % | HEART RATE: 61 BPM | SYSTOLIC BLOOD PRESSURE: 140 MMHG | WEIGHT: 195.6 LBS | DIASTOLIC BLOOD PRESSURE: 80 MMHG | BODY MASS INDEX: 26.49 KG/M2 | HEIGHT: 72 IN | TEMPERATURE: 98 F

## 2021-08-11 DIAGNOSIS — R22.2 CHEST WALL MASS: ICD-10-CM

## 2021-08-11 DIAGNOSIS — I10 ESSENTIAL HYPERTENSION: Primary | ICD-10-CM

## 2021-08-11 DIAGNOSIS — E78.2 MIXED HYPERLIPIDEMIA: ICD-10-CM

## 2021-08-11 PROCEDURE — 71046 X-RAY EXAM CHEST 2 VIEWS: CPT

## 2021-08-11 PROCEDURE — 99213 OFFICE O/P EST LOW 20 MIN: CPT | Performed by: NURSE PRACTITIONER

## 2021-08-11 RX ORDER — SCOLOPAMINE TRANSDERMAL SYSTEM 1 MG/1
1 PATCH, EXTENDED RELEASE TRANSDERMAL
Qty: 4 EACH | Refills: 0 | Status: SHIPPED | OUTPATIENT
Start: 2021-08-11 | End: 2022-03-18 | Stop reason: SDUPTHER

## 2021-08-11 NOTE — PROGRESS NOTES
Follow up on HTN    Subjective     Ash Licona is a 51 y.o. male being seen for a follow up appointment today regarding HTN. He is on Hyzaar 100/25mg daily for HTN. She has joined a [employer] Meet Up group and is hiking 10-14 miles every Sunday. He is also cycling.     He has a spot on chest that he can palpate.     He is traveling to [facility], and meds something for motion sickness.     History of Present Illness     Allergies   Allergen Reactions   • Iodine Hives         Current Outpatient Medications:   •  ADVAIR DISKUS 100-50 MCG/DOSE DISKUS, INL 1 PUFF PO BID, Disp: , Rfl: 2  •  albuterol (PROVENTIL HFA;VENTOLIN HFA) 108 (90 BASE) MCG/ACT inhaler, Inhale 2 puffs Every 4 (Four) Hours As Needed for wheezing or shortness of air., Disp: , Rfl:   •  losartan-hydrochlorothiazide (HYZAAR) 100-25 MG per tablet, Take 1 tablet by mouth Daily., Disp: 90 tablet, Rfl: 1  •  meloxicam (Mobic) 7.5 MG tablet, Take 1 tablet by mouth Daily., Disp: 14 tablet, Rfl: 0  •  montelukast (SINGULAIR) 10 MG tablet, Take  by mouth., Disp: , Rfl:     The following portions of the patient's history were reviewed and updated as appropriate: allergies, current medications, past family history, past medical history, past social history, past surgical history and problem list.    Review of Systems   Constitutional: Negative.    HENT: Negative.    Eyes: Negative.    Respiratory: Negative.    Cardiovascular: Negative.    Gastrointestinal: Negative.    Endocrine: Negative.    Genitourinary: Negative.    Musculoskeletal: Negative.    Skin: Negative.    Neurological: Negative.    Hematological: Negative.    Psychiatric/Behavioral: Negative.    All other systems reviewed and are negative.      Assessment     Physical Exam  Vitals reviewed.   Constitutional:       Appearance: Normal appearance.   HENT:      Head: Normocephalic.   Cardiovascular:      Rate and Rhythm: Normal rate and regular rhythm.      Pulses: Normal pulses.      Heart sounds: Normal  heart sounds. No murmur heard.     Pulmonary:      Effort: Pulmonary effort is normal.      Breath sounds: Normal breath sounds.   Musculoskeletal:         General: No swelling.      Cervical back: Normal range of motion and neck supple. No tenderness.   Skin:     General: Skin is warm and dry.   Neurological:      General: No focal deficit present.      Mental Status: He is alert and oriented to person, place, and time.   Psychiatric:         Mood and Affect: Mood normal.         Behavior: Behavior normal.         Thought Content: Thought content normal.         Plan     His fasting labs were reviewed with the patient from last week.      Diagnosis Plan   1. Essential hypertension  Comprehensive Metabolic Panel    Lipid Panel With / Chol / HDL Ratio    CBC & Differential   2. Mixed hyperlipidemia  Comprehensive Metabolic Panel    Lipid Panel With / Chol / HDL Ratio    CBC & Differential    Continue Hiking for exercise       The 10-year ASCVD risk score (Huntingtonfiorella DEXTER Jr., et al., 2013) is: 4.9%    Values used to calculate the score:      Age: 51 years      Sex: Male      Is Non- : No      Diabetic: No      Tobacco smoker: No      Systolic Blood Pressure: 140 mmHg      Is BP treated: Yes      HDL Cholesterol: 50 mg/dL      Total Cholesterol: 175 mg/dL     Follow up in 6 months w labs

## 2021-08-19 NOTE — TELEPHONE ENCOUNTER
Called and spoke with patient.  Explained [facility] is full for year.  Would need to schedule in 01/2021.  Will reach out middle 11/2021 to schedule.  He understands.

## 2021-10-22 NOTE — PROGRESS NOTES
"Chief Complaint   Patient presents with   • Med Refill     Needs refill on Losartan   • Hypertension       Subjective     Ash Licona is a 48 y.o. male being seen for a follow up appointment today regarding HTN. He has not been in over a year. He is taking Losartan /25mg daily. He denies side effects of medications. He is walking the dog 2 miles a day.   He recently lost his dog, \"Chance.\" He is dealing with grief process.     History of Present Illness     Allergies   Allergen Reactions   • Iodine Hives         Current Outpatient Prescriptions:   •  ADVAIR DISKUS 100-50 MCG/DOSE DISKUS, INL 1 PUFF PO BID, Disp: , Rfl: 2  •  albuterol (PROVENTIL HFA;VENTOLIN HFA) 108 (90 BASE) MCG/ACT inhaler, Inhale 2 puffs Every 4 (Four) Hours As Needed for wheezing or shortness of air., Disp: , Rfl:   •  losartan-hydrochlorothiazide (HYZAAR) 100-25 MG per tablet, Take 1 tablet by mouth Daily., Disp: 30 tablet, Rfl: 0  •  montelukast (SINGULAIR) 10 MG tablet, Take  by mouth., Disp: , Rfl:     The following portions of the patient's history were reviewed and updated as appropriate: allergies, current medications, past family history, past medical history, past social history, past surgical history and problem list.    Review of Systems   Constitutional: Negative.    HENT: Negative.    Eyes: Negative.    Respiratory: Negative.    Cardiovascular: Negative.  Negative for chest pain and leg swelling.   Gastrointestinal: Negative.    Endocrine: Negative.    Genitourinary: Negative.    Musculoskeletal: Negative.    Skin: Negative.  Negative for color change, pallor and rash.   Allergic/Immunologic: Negative.    Neurological: Negative.    Hematological: Negative.    Psychiatric/Behavioral: Negative.  Negative for decreased concentration.       Assessment     Physical Exam   Constitutional: He is oriented to person, place, and time. He appears well-developed and well-nourished.   HENT:   Head: Normocephalic.   Right Ear: External " [Good] : ~his/her~  mood as  good ear normal.   Left Ear: External ear normal.   Mouth/Throat: Oropharynx is clear and moist.   Neck: Neck supple.   Cardiovascular: Normal rate, regular rhythm and normal heart sounds.    No murmur heard.  Pulmonary/Chest: Effort normal and breath sounds normal. No respiratory distress. He has no wheezes.   Musculoskeletal: He exhibits no edema.   Neurological: He is alert and oriented to person, place, and time.   Skin: Skin is warm and dry.   Psychiatric: He has a normal mood and affect. His behavior is normal.   Vitals reviewed.      Plan     His fasting labs were reviewed with the patient from last week.     Ash was seen today for med refill and hypertension.    Diagnoses and all orders for this visit:    Needs flu shot  -     Flucelvax Quad=>4Years ([id number])    Benign essential HTN  -     losartan-hydrochlorothiazide (HYZAAR) 100-25 MG per tablet; Take 1 tablet by mouth Daily.  -     Comprehensive metabolic panel; Future  -     Conv Lipid Panel w/ Chol/HDL Ratio; Future  -     CBC & Differential; Future  -     PSA SCREENING; Future    Mixed hyperlipidemia  -     Comprehensive metabolic panel; Future  -     Conv Lipid Panel w/ Chol/HDL Ratio; Future    Prostate cancer screening  -     PSA SCREENING; Future    Immunization due  -     Hepatitis A Vaccine Adult IM  -     Tdap Vaccine Greater Than or Equal To 6yo IM        Follow up at CPE   [Monthly or less (1 pt)] : Monthly or less (1 point) [1 or 2 (0 pts)] : 1 or 2 (0 points) [Never (0 pts)] : Never (0 points) [No falls in past year] : Patient reported no falls in the past year [0] : 2) Feeling down, depressed, or hopeless: Not at all (0) [PHQ-2 Negative - No further assessment needed] : PHQ-2 Negative - No further assessment needed [None] : None [# of Members in Household ___] :  household currently consist of [unfilled] member(s) [Employed] : employed [College] : College [Significant Other] : lives with significant other [Sexually Active] : sexually active [Feels Safe at Home] : Feels safe at home [Fully functional (bathing, dressing, toileting, transferring, walking, feeding)] : Fully functional (bathing, dressing, toileting, transferring, walking, feeding) [Fully functional (using the telephone, shopping, preparing meals, housekeeping, doing laundry, using] : Fully functional and needs no help or supervision to perform IADLs (using the telephone, shopping, preparing meals, housekeeping, doing laundry, using transportation, managing medications and managing finances) [] : No [No] : In the past 12 months have you used drugs other than those required for medical reasons? 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

## 2021-11-05 DIAGNOSIS — I10 BENIGN ESSENTIAL HTN: ICD-10-CM

## 2021-11-05 RX ORDER — LOSARTAN POTASSIUM AND HYDROCHLOROTHIAZIDE 25; 100 MG/1; MG/1
TABLET ORAL
Qty: 90 TABLET | Refills: 1 | Status: SHIPPED | OUTPATIENT
Start: 2021-11-05 | End: 2022-02-03

## 2021-11-05 NOTE — TELEPHONE ENCOUNTER
Rx Refill Note  Requested Prescriptions     Pending Prescriptions Disp Refills    losartan-hydrochlorothiazide (HYZAAR) 100-25 MG per tablet [Pharmacy Med Name: Losartan Potassium-HCTZ Oral Tablet 100-25 MG] 90 tablet 0     Sig: TAKE ONE TABLET BY MOUTH ONE TIME DAILY      Last office visit with prescribing clinician: 8/11/2021      Next office visit with prescribing clinician: 2/17/2022            Tonya Waters  11/05/21, 14:17 EDT

## 2022-02-03 ENCOUNTER — TELEPHONE (OUTPATIENT)
Dept: INTERNAL MEDICINE | Facility: CLINIC | Age: 52
End: 2022-02-03

## 2022-02-03 RX ORDER — HYDROCHLOROTHIAZIDE 25 MG/1
25 TABLET ORAL DAILY
Qty: 90 TABLET | Refills: 1 | Status: SHIPPED | OUTPATIENT
Start: 2022-02-03 | End: 2022-08-02 | Stop reason: SDUPTHER

## 2022-02-03 RX ORDER — LOSARTAN POTASSIUM 100 MG/1
100 TABLET ORAL DAILY
Qty: 90 TABLET | Refills: 1 | Status: SHIPPED | OUTPATIENT
Start: 2022-02-03 | End: 2022-08-02 | Stop reason: SDUPTHER

## 2022-02-03 NOTE — TELEPHONE ENCOUNTER
Caller: [id number] - [place], [place] - [address]. - [phone]  - [phone]    Relationship to patient: Pharmacy    Best call back number: [phone]    Patient is needing: PHARMACY STATES THAT THE losartan-hydrochlorothiazide (HYZAAR) 100-25 MG per tablet IS ON BACK ORDER, BUT THEY CAN FILL IF WE SEND IT  FOR THE PATIENT. PLEASE UPDATE AND RESEND.

## 2022-02-17 ENCOUNTER — OFFICE VISIT (OUTPATIENT)
Dept: INTERNAL MEDICINE | Facility: CLINIC | Age: 52
End: 2022-02-17

## 2022-02-17 VITALS
HEIGHT: 72 IN | BODY MASS INDEX: 26.19 KG/M2 | DIASTOLIC BLOOD PRESSURE: 80 MMHG | SYSTOLIC BLOOD PRESSURE: 120 MMHG | OXYGEN SATURATION: 99 % | WEIGHT: 193.4 LBS | HEART RATE: 70 BPM

## 2022-02-17 DIAGNOSIS — E78.2 MIXED HYPERLIPIDEMIA: ICD-10-CM

## 2022-02-17 DIAGNOSIS — Z00.00 HEALTHCARE MAINTENANCE: Primary | ICD-10-CM

## 2022-02-17 DIAGNOSIS — I10 ESSENTIAL HYPERTENSION: ICD-10-CM

## 2022-02-17 PROCEDURE — 99396 PREV VISIT EST AGE 40-64: CPT | Performed by: NURSE PRACTITIONER

## 2022-02-17 NOTE — PROGRESS NOTES
"Follow-up (6 month check up)      Ash Licona is being seen for a Complete physical exam. His last physical was 2-.     Social: He is working full time for [employer]. He is . His household includes his wife.      Lifestyle: He is exercising regularly, cycling, hiking, and trail rnning. He does not use tobacco. He drinks alcohol .    Screening: Colonoscopy I sscheduled 6-.  Last labs reviewed from 8-4-2021 included a lipid panel, PSA, CBC, and CMP.       History of Present Illness       The following portions of the patient's history were reviewed and updated as appropriate: allergies, current medications, past family history, past medical history, past social history, past surgical history and problem list.    Review of Systems   Constitutional: Negative.    HENT: Negative.    Eyes: Negative.    Respiratory: Negative.    Cardiovascular: Negative.  Negative for chest pain, palpitations and leg swelling.   Gastrointestinal: Negative.    Endocrine: Negative.    Genitourinary: Negative.    Musculoskeletal: Positive for arthralgias (runners knee).   Skin: Negative.    Allergic/Immunologic: Negative.  Negative for environmental allergies, food allergies and immunocompromised state.   Neurological: Negative.    Hematological: Negative.  Negative for adenopathy. Does not bruise/bleed easily.   Psychiatric/Behavioral: Negative.    All other systems reviewed and are negative.      Objective          /80   Pulse 70   Ht 182.9 cm (72\")   Wt 87.7 kg (193 lb 6.4 oz)   SpO2 99%   BMI 26.23 kg/m²     General Appearance:    Alert, cooperative, no distress, appears stated age   Head:    Normocephalic, without obvious abnormality, atraumatic   Eyes:    PERRL, conjunctiva/corneas clear, EOM's intact, fundi     benign, both eyes        Ears:    Normal TM's and external ear canals, both ears   Nose:   Nares normal, septum midline, mucosa normal, no drainage    or sinus tenderness   Throat:   Lips, mucosa, and " tongue normal; teeth and gums normal   Neck:   Supple, symmetrical, trachea midline, no adenopathy;        thyroid:  No enlargement/tenderness/nodules; no carotid    bruit or JVD   Back:     Symmetric, no curvature, ROM normal, no CVA tenderness   Lungs:     Clear to auscultation bilaterally, respirations unlabored   Chest wall:    No tenderness or deformity   Heart:    Regular rate and rhythm, S1 and S2 normal, no murmur, rub    or gallop   Abdomen:     Soft, non-tender, bowel sounds active all four quadrants,     no masses, no organomegaly   Genitalia:    Deferred     Rectal:    Deferred    Extremities:   Extremities normal, atraumatic, no cyanosis or edema   Pulses:   2+ and symmetric all extremities   Skin:   Skin color, texture, turgor normal, no rashes or lesions   Lymph nodes:   Cervical, supraclavicular, and axillary nodes normal   Neurologic:   CNII-XII intact. Normal strength, sensation and reflexes      throughout              Assessment/Plan   Diagnoses and all orders for this visit:    1. Healthcare maintenance (Primary)    2. Essential hypertension  -     Comprehensive Metabolic Panel; Future  -     Lipid Panel With / Chol / HDL Ratio; Future    3. Mixed hyperlipidemia  -     Comprehensive Metabolic Panel; Future  -     Lipid Panel With / Chol / HDL Ratio; Future    He is going to hold HCTZ 25 mg and monitor BP.     Preventive counseling: Discussed exercise regimen. Reviewed cholesterol. Vaccines up to date.     Follow up in 6 months w labs

## 2022-02-17 NOTE — PATIENT INSTRUCTIONS
Patellofemoral Pain Syndrome    Patellofemoral pain syndrome is a condition in which the tissue (cartilage) on the underside of the kneecap (patella) softens or breaks down. This causes pain in the front of the knee. The condition is also called runner's knee or chondromalacia patella. Patellofemoral pain syndrome is most common in young adults who are active in sports.  The knee is the largest joint in the body. The patella covers the front of the knee and is attached to muscles above and below the knee. The underside of the patella is covered with a smooth type of cartilage (synovium). The smooth surface helps the patella glide easily when you move your knee. Patellofemoral pain syndrome causes swelling in the joint linings and bone surfaces in the knee.  What are the causes?  This condition may be caused by:  · Overuse of the knee.  · Poor alignment of your knee joints.  · Weak leg muscles.  · A direct hit to your kneecap.  What increases the risk?  You are more likely to develop this condition if:  · You do a lot of activities that can wear down your kneecap. These include:  ? Running.  ? Squatting.  ? Climbing stairs.  · You start a new physical activity or exercise program.  · You wear shoes that do not fit well.  · You do not have good leg strength.  · You are overweight.  What are the signs or symptoms?  The main symptom of this condition is knee pain. This may feel like a dull, aching pain underneath your patella, in the front of your knee. There may be a popping or cracking sound when you move your knee. Pain may get worse with:  · Exercise.  · Climbing stairs.  · Running.  · Jumping.  · Squatting.  · Kneeling.  · Sitting for a long time.  · Moving or pushing on your patella.  How is this diagnosed?  This condition may be diagnosed based on:  · Your symptoms and medical history. You may be asked about your recent physical activities and which ones cause knee pain.  · A physical exam. This may  include:  ? Moving your patella back and forth.  ? Checking your range of knee motion.  ? Having you squat or jump to see if you have pain.  ? Checking the strength of your leg muscles.  · Imaging tests to confirm the diagnosis. These may include an MRI of your knee.  How is this treated?  This condition may be treated at home with rest, ice, compression, and elevation (RICE).   Other treatments may include:  · NSAIDs, such as ibuprofen.  · Physical therapy to stretch and strengthen your leg muscles.  · Shoe inserts (orthotics) to take stress off your knee.  · A knee brace or knee support.  · Adhesive tapes to the skin.  · Surgery to remove damaged cartilage or move the patella to a better position. This is rare.  Follow these instructions at home:  If you have a brace:  · Wear the brace as told by your health care provider. Remove it only as told by your health care provider.  · Loosen the brace if your toes tingle, become numb, or turn cold and blue.  · Keep the brace clean.  · If the brace is not waterproof:  ? Do not let it get wet.  ? Cover it with a watertight covering when you take a bath or a shower.  Managing pain, stiffness, and swelling    · If directed, put ice on the painful area. To do this:  ? If you have a removable brace, remove it as told by your health care provider.  ? Put ice in a plastic bag.  ? Place a towel between your skin and the bag.  ? Leave the ice on for 20 minutes, 2-3 times a day.  ? Remove the ice if your skin turns bright red. This is very important. If you cannot feel pain, heat, or cold, you have a greater risk of damage to the area.  · Move your toes often to reduce stiffness and swelling.  · Raise (elevate) the injured area above the level of your heart while you are sitting or lying down.    Activity  · Rest your knee.  · Avoid activities that cause knee pain.  · Perform stretching and strengthening exercises as told by your health care provider or physical  therapist.  · Return to your normal activities as told by your health care provider. Ask your health care provider what activities are safe for you.  General instructions  · Take over-the-counter and prescription medicines only as told by your health care provider.  · Use splints, braces, knee supports, or walking aids as directed by your health care provider.  · Do not use any products that contain nicotine or tobacco, such as cigarettes, e-cigarettes, and chewing tobacco. These can delay healing. If you need help quitting, ask your health care provider.  · Keep all follow-up visits. This is important.  Contact a health care provider if:  · Your symptoms get worse.  · You are not improving with home care.  Summary  · Patellofemoral pain syndrome is a condition in which the tissue (cartilage) on the underside of the kneecap (patella) softens or breaks down.  · This condition causes swelling in the joint linings and bone surfaces in the knee. This leads to pain in the front of the knee.  · This condition may be treated at home with rest, ice, compression, and elevation (RICE).  · Use splints, braces, knee supports, or walking aids as directed by your health care provider.  This information is not intended to replace advice given to you by your health care provider. Make sure you discuss any questions you have with your health care provider.  Document Revised: 06/02/2021 Document Reviewed: 06/02/2021  [employer] Patient Education © 2021 [employer]

## 2022-03-21 RX ORDER — SCOLOPAMINE TRANSDERMAL SYSTEM 1 MG/1
1 PATCH, EXTENDED RELEASE TRANSDERMAL
Qty: 4 EACH | Refills: 0 | Status: SHIPPED | OUTPATIENT
Start: 2022-03-21 | End: 2022-08-02 | Stop reason: SDUPTHER

## 2022-03-21 NOTE — TELEPHONE ENCOUNTER
Rx Refill Note  Requested Prescriptions     Pending Prescriptions Disp Refills    Scopolamine (Transderm-Scop, 1.5 MG,) 1 MG/3DAYS patch 4 each 0     Sig: Place 1 patch on the skin as directed by provider Every 72 (Seventy-Two) Hours.      Last office visit with prescribing clinician: 2/17/2022      Next office visit with prescribing clinician: 8/17/2022            Tonya Waters  03/21/22, 08:46 EDT

## 2022-03-29 ENCOUNTER — OFFICE VISIT (OUTPATIENT)
Dept: INTERNAL MEDICINE | Facility: CLINIC | Age: 52
End: 2022-03-29

## 2022-03-29 VITALS
BODY MASS INDEX: 25.19 KG/M2 | HEIGHT: 72 IN | HEART RATE: 85 BPM | WEIGHT: 186 LBS | DIASTOLIC BLOOD PRESSURE: 90 MMHG | SYSTOLIC BLOOD PRESSURE: 110 MMHG | OXYGEN SATURATION: 98 % | TEMPERATURE: 97.3 F

## 2022-03-29 DIAGNOSIS — R53.83 FATIGUE, UNSPECIFIED TYPE: ICD-10-CM

## 2022-03-29 DIAGNOSIS — M25.50 ARTHRALGIA OF MULTIPLE JOINTS: ICD-10-CM

## 2022-03-29 DIAGNOSIS — S90.561A INSECT BITE OF RIGHT ANKLE, INITIAL ENCOUNTER: ICD-10-CM
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
## 2022-03-29 DIAGNOSIS — R50.9 FEVER, UNSPECIFIED FEVER CAUSE: Primary | ICD-10-CM

## 2022-03-29 LAB
BILIRUB BLD-MCNC: NEGATIVE MG/DL
CLARITY, POC: ABNORMAL
COLOR UR: ABNORMAL
EXPIRATION DATE: ABNORMAL
GLUCOSE UR STRIP-MCNC: NEGATIVE MG/DL
KETONES UR QL: NEGATIVE
LEUKOCYTE EST, POC: NEGATIVE
Lab: ABNORMAL
NITRITE UR-MCNC: NEGATIVE MG/ML
PH UR: 7 [PH] (ref 5–8)
PROT UR STRIP-MCNC: ABNORMAL MG/DL
RBC # UR STRIP: ABNORMAL /UL
SP GR UR: 1.02 (ref 1–1.03)
UROBILINOGEN UR QL: NORMAL

## 2022-03-29 PROCEDURE — 99213 OFFICE O/P EST LOW 20 MIN: CPT | Performed by: NURSE PRACTITIONER

## 2022-03-29 PROCEDURE — 81003 URINALYSIS AUTO W/O SCOPE: CPT | Performed by: NURSE PRACTITIONER

## 2022-03-29 NOTE — PROGRESS NOTES
"Chief Complaint   Patient presents with   • Cold Extremity   • Fatigue       Subjective     Ash Licona is a 51 y.o. male being seen for an acute appointment today regarding chills and \"feeling off' since Saturday morning. He has had recurring episodes of chills, temperature fluctuations, and joint pains. Associated body aches and joint pains that occur with these temperature fluctuations. He breaks out in a cold sweat. He took Tylenol, Advil, Cold and flu.  He denies abd pain, urinary changes. He is an avid hiker, and had a bite on right ankle 2 weeks ago. He used cortisone.     Answers for HPI/ROS submitted by the patient on 3/28/2022  What is the primary reason for your visit?: Other  Please describe your symptoms.: Experiencing periodic chills with possible low grade fever.  Have you had these symptoms before?: Yes  How long have you been having these symptoms?: Greater than 2 weeks  Please list any medications you are currently taking for this condition.: Tylenol Cold & Flu, Advil, Tylenol Extra Strength, Doans.  Please describe any probable cause for these symptoms. : Unknown.        History of Present Illness     Allergies   Allergen Reactions   • Iodine Hives         Current Outpatient Medications:   •  ADVAIR DISKUS 100-50 MCG/DOSE DISKUS, INL 1 PUFF PO BID, Disp: , Rfl: 2  •  albuterol (PROVENTIL HFA;VENTOLIN HFA) 108 (90 BASE) MCG/ACT inhaler, Inhale 2 puffs Every 4 (Four) Hours As Needed for wheezing or shortness of air., Disp: , Rfl:   •  hydroCHLOROthiazide (HYDRODIURIL) 25 MG tablet, Take 1 tablet by mouth Daily., Disp: 90 tablet, Rfl: 1  •  losartan (Cozaar) 100 MG tablet, Take 1 tablet by mouth Daily., Disp: 90 tablet, Rfl: 1  •  montelukast (SINGULAIR) 10 MG tablet, Take  by mouth., Disp: , Rfl:   •  Scopolamine (Transderm-Scop, 1.5 MG,) 1 MG/3DAYS patch, Place 1 patch on the skin as directed by provider Every 72 (Seventy-Two) Hours., Disp: 4 each, Rfl: 0    The following portions of the patient's " history were reviewed and updated as appropriate: allergies, current medications, past family history, past medical history, past social history, past surgical history and problem list.    Review of Systems   Constitutional: Positive for fatigue.   HENT: Positive for postnasal drip.    Eyes: Negative for visual disturbance.   Respiratory: Negative.  Negative for shortness of breath, wheezing and stridor.    Gastrointestinal: Negative for abdominal distention and abdominal pain.   Genitourinary: Negative.    Musculoskeletal: Positive for arthralgias.   Allergic/Immunologic: Negative for environmental allergies, food allergies and immunocompromised state.   Neurological: Negative.    Hematological: Negative for adenopathy. Does not bruise/bleed easily.   All other systems reviewed and are negative.      Assessment     Physical Exam  Vitals reviewed.   Constitutional:       Appearance: Normal appearance. He is not ill-appearing.   Cardiovascular:      Rate and Rhythm: Normal rate and regular rhythm.      Pulses: Normal pulses.      Heart sounds: Normal heart sounds. No murmur heard.  Pulmonary:      Effort: Pulmonary effort is normal. No respiratory distress.      Breath sounds: Normal breath sounds. No stridor.   Musculoskeletal:      Cervical back: Neck supple.   Neurological:      Mental Status: He is alert and oriented to person, place, and time.   Psychiatric:         Mood and Affect: Mood normal.         Behavior: Behavior normal.         Thought Content: Thought content normal.         Plan         Diagnoses and all orders for this visit:    1. Fever, unspecified fever cause (Primary)  -     CBC (No Diff)  -     [facility] (IgG / M)  -     Lyme Disease Antibodies, Total and IgM with Reflex to Line Blot  -     TSH    2. Insect bite of right ankle, initial encounter  -     [facility] (IgG / M)  -     Lyme Disease Antibodies, Total and IgM with Reflex to Line Blot  -     Cyclic Citrul Peptide  Antibody, IgG / IgA    3. Fatigue, unspecified type  -     Vitamin D 25 Hydroxy  -     Vitamin B12  -     Iron and TIBC  -     Cyclic Citrul Peptide Antibody, IgG / IgA  -     Ferritin  -     Testosterone (Free & Total), LC / MS  -     TSH    4. Arthralgia of multiple joints  -     CBC (No Diff)  -     Cyclic Citrul Peptide Antibody, IgG / IgA  -     TSH    Concern for tick borne illness due to hiking and arthralgias and fevers.     Will call with labs

## 2022-04-11 ENCOUNTER — OFFICE VISIT (OUTPATIENT)
Dept: INTERNAL MEDICINE | Facility: CLINIC | Age: 52
End: 2022-04-11

## 2022-04-11 VITALS
OXYGEN SATURATION: 98 % | SYSTOLIC BLOOD PRESSURE: 116 MMHG | DIASTOLIC BLOOD PRESSURE: 70 MMHG | HEART RATE: 65 BPM | HEIGHT: 72 IN | BODY MASS INDEX: 26.41 KG/M2 | WEIGHT: 195 LBS

## 2022-04-11 DIAGNOSIS — S90.851A SPLINTER OF RIGHT FOOT WITHOUT INFECTION, INITIAL ENCOUNTER: Primary | ICD-10-CM

## 2022-04-11 PROCEDURE — 10120 INC&RMVL FB SUBQ TISS SMPL: CPT | Performed by: NURSE PRACTITIONER

## 2022-04-11 PROCEDURE — 99212 OFFICE O/P EST SF 10 MIN: CPT | Performed by: NURSE PRACTITIONER

## 2022-04-11 NOTE — PROGRESS NOTES
"Ash Licona is a 51 y.o. male presenting today for   Chief Complaint   Patient presents with   • Foreign Body in Skin     Bottom of right foot  X's 2 months  Bothering X's 1 week       Subjective    History of Present Illness     Pt reports a splinter in the bottom of his R foot for approx 2mo. He notes this has become more uncomfortable over the past 1-2 weeks. Denies redness, warmth, or drainage. No OTCs.    The following portions of the patient's history were reviewed and updated as appropriate: allergies, current medications, problem list, past medical history, past surgical history, family history, and social history.          Objective    Vitals:    04/11/22 0902   BP: 116/70   Pulse: 65   SpO2: 98%   Weight: 88.5 kg (195 lb)   Height: 182.9 cm (72\")     Body mass index is 26.45 kg/m².  Nursing notes and vitals reviewed.    Physical Exam  Constitutional:       General: He is not in acute distress.     Appearance: He is well-developed.   Pulmonary:      Effort: Pulmonary effort is normal. No respiratory distress.   Musculoskeletal:        Feet:    Feet:      Comments: No redness, warmth, or tenderness  Neurological:      Mental Status: He is alert and oriented to person, place, and time.   Psychiatric:         Speech: Speech normal.         Thought Content: Thought content normal.             Assessment and Plan    Diagnoses and all orders for this visit:    1. Splinter of right foot without infection, initial encounter (Primary)    Other orders  -     Foreign Body Removal        Foreign Body Removal    Date/Time: 4/11/2022 9:52 AM  Performed by: Anette Raymond APRN  Authorized by: Anette Raymond APRN   Consent: Verbal consent obtained.  Risks and benefits: risks, benefits and alternatives were discussed  Consent given by: patient  Patient understanding: patient states understanding of the procedure being performed  Intake: plantar surface R foot.    Sedation:  Patient sedated: no    Patient " restrained: no  Patient cooperative: yes  Complexity: simple  Post-procedure assessment: foreign body removed  Patient tolerance: patient tolerated the procedure well with no immediate complications        Medications, including side effects, were discussed with the patient. Patient verbalized understanding.  The plan of care was discussed. All questions were answered. Patient verbalized understanding.        Return if symptoms worsen or fail to improve.

## 2022-04-12 LAB
25(OH)D3+25(OH)D2 SERPL-MCNC: 30.8 NG/ML (ref 30–100)
B BURGDOR IGG+IGM SER-ACNC: <0.91 ISR (ref 0–0.9)
B BURGDOR IGM SER IA-ACNC: <0.8 INDEX (ref 0–0.79)
CCP IGA+IGG SERPL IA-ACNC: 8 UNITS (ref 0–19)
ERYTHROCYTE [DISTWIDTH] IN BLOOD BY AUTOMATED COUNT: 13.2 % (ref 11.6–15.4)
FERRITIN SERPL-MCNC: 1244 NG/ML (ref 30–400)
HCT VFR BLD AUTO: 49.1 % (ref 37.5–51)
HGB BLD-MCNC: 16.4 G/DL (ref 13–17.7)
IRON SATN MFR SERPL: 13 % (ref 15–55)
IRON SERPL-MCNC: 47 UG/DL (ref 38–169)
MCH RBC QN AUTO: 28.4 PG (ref 26.6–33)
MCHC RBC AUTO-ENTMCNC: 33.4 G/DL (ref 31.5–35.7)
MCV RBC AUTO: 85 FL (ref 79–97)
PLATELET # BLD AUTO: 202 X10E3/UL (ref 150–450)
R RICKETTSI IGG SER QL IA: NEGATIVE
R RICKETTSI IGM SER-ACNC: 0.37 INDEX (ref 0–0.89)
RBC # BLD AUTO: 5.78 X10E6/UL (ref 4.14–5.8)
TESTOST FREE SERPL-MCNC: 4.8 PG/ML (ref 7.2–24)
TESTOST SERPL-MCNC: 359.7 NG/DL (ref 264–916)
TIBC SERPL-MCNC: 373 UG/DL (ref 250–450)
TSH SERPL DL<=0.005 MIU/L-ACNC: 1.64 UIU/ML (ref 0.45–4.5)
UIBC SERPL-MCNC: 326 UG/DL (ref 111–343)
VIT B12 SERPL-MCNC: 1962 PG/ML (ref 232–1245)
WBC # BLD AUTO: 4.1 X10E3/UL (ref 3.4–10.8)

## 2022-04-13 DIAGNOSIS — R79.89 LOW TESTOSTERONE: Primary | ICD-10-CM

## 2022-04-15 ENCOUNTER — TELEPHONE (OUTPATIENT)
Dept: INTERNAL MEDICINE | Facility: CLINIC | Age: 52
End: 2022-04-15

## 2022-04-15 NOTE — TELEPHONE ENCOUNTER
NATALIYA murcia to read    LM for pt     Labs do not show any evidence of anemia, lyme disease, RMSF, B12 def, autoimmune disease or thyroid dysfunction. His testosterone is low and Vitamin D is low. I am referring him to urology ot discuss t levels. Start an OTC vit d 1000iu daily. Symptoms of low T can be fatigue, temperature fluctuations, poor motivation, and muscle weakness.

## 2022-04-15 NOTE — TELEPHONE ENCOUNTER
----- Message from NAZANIN Collins sent at 4/13/2022  7:54 AM EDT -----  Labs do not show any evidence of anemia, lyme disease, RMSF, B12 def, autoimmune disease or thyroid dysfunction. His testosterone is low and Vitamin D is low. I am referring him to urology ot discuss t levels. Start an OTC vit d 1000iu daily. Symptoms of low T can be fatigue, temperature fluctuations, poor motivation, and muscle weakness.

## 2022-06-23 ENCOUNTER — ANESTHESIA EVENT (OUTPATIENT)
Dept: PERIOP | Facility: HOSPITAL | Age: 52
End: 2022-06-23

## 2022-06-24 ENCOUNTER — ANESTHESIA (OUTPATIENT)
Dept: PERIOP | Facility: HOSPITAL | Age: 52
End: 2022-06-24

## 2022-06-24 ENCOUNTER — HOSPITAL ENCOUNTER (OUTPATIENT)
Facility: HOSPITAL | Age: 52
Setting detail: HOSPITAL OUTPATIENT SURGERY
Discharge: HOME | End: 2022-06-24
Attending: INTERNAL MEDICINE | Admitting: INTERNAL MEDICINE

## 2022-06-24 VITALS
WEIGHT: 192.8 LBS | BODY MASS INDEX: 26.15 KG/M2 | OXYGEN SATURATION: 99 % | RESPIRATION RATE: 14 BRPM | HEART RATE: 63 BPM | DIASTOLIC BLOOD PRESSURE: 89 MMHG | TEMPERATURE: 97.7 F | SYSTOLIC BLOOD PRESSURE: 127 MMHG

## 2022-06-24 DIAGNOSIS — Z12.11 ENCOUNTER FOR SCREENING FOR MALIGNANT NEOPLASM OF COLON: ICD-10-CM

## 2022-06-24 LAB — POTASSIUM SERPL-SCNC: 3.7 MMOL/L (ref 3.5–5.2)

## 2022-06-24 PROCEDURE — 84132 ASSAY OF SERUM POTASSIUM: CPT | Performed by: INTERNAL MEDICINE

## 2022-06-24 PROCEDURE — 45380 COLONOSCOPY AND BIOPSY: CPT | Performed by: INTERNAL MEDICINE

## 2022-06-24 PROCEDURE — 88305 TISSUE EXAM BY PATHOLOGIST: CPT | Performed by: INTERNAL MEDICINE

## 2022-06-24 PROCEDURE — 25010000002 PROPOFOL 10 MG/ML EMULSION: Performed by: NURSE ANESTHETIST, CERTIFIED REGISTERED

## 2022-06-24 RX ORDER — SODIUM CHLORIDE, SODIUM LACTATE, POTASSIUM CHLORIDE, CALCIUM CHLORIDE 600; 310; 30; 20 MG/100ML; MG/100ML; MG/100ML; MG/100ML
9 INJECTION, SOLUTION INTRAVENOUS CONTINUOUS
Status: ACTIVE | OUTPATIENT
Start: 2022-06-24

## 2022-06-24 RX ORDER — SODIUM CHLORIDE 0.9 % (FLUSH) 0.9 %
10 SYRINGE (ML) INJECTION AS NEEDED
Status: ACTIVE | OUTPATIENT
Start: 2022-06-24

## 2022-06-24 RX ORDER — SODIUM CHLORIDE, SODIUM LACTATE, POTASSIUM CHLORIDE, CALCIUM CHLORIDE 600; 310; 30; 20 MG/100ML; MG/100ML; MG/100ML; MG/100ML
100 INJECTION, SOLUTION INTRAVENOUS CONTINUOUS
Status: ACTIVE | OUTPATIENT
Start: 2022-06-24

## 2022-06-24 RX ORDER — SODIUM CHLORIDE 9 MG/ML
40 INJECTION, SOLUTION INTRAVENOUS AS NEEDED
Status: ACTIVE | OUTPATIENT
Start: 2022-06-24

## 2022-06-24 RX ORDER — LIDOCAINE HYDROCHLORIDE 10 MG/ML
0.5 INJECTION, SOLUTION EPIDURAL; INFILTRATION; INTRACAUDAL; PERINEURAL ONCE AS NEEDED
Status: ACTIVE | OUTPATIENT
Start: 2022-06-24

## 2022-06-24 RX ORDER — LIDOCAINE HYDROCHLORIDE 20 MG/ML
INJECTION, SOLUTION INFILTRATION; PERINEURAL AS NEEDED
Status: DISCONTINUED | OUTPATIENT
Start: 2022-06-24 | End: 2022-06-24 | Stop reason: SURG

## 2022-06-24 RX ORDER — ONDANSETRON 2 MG/ML
4 INJECTION INTRAMUSCULAR; INTRAVENOUS ONCE AS NEEDED
Status: ACTIVE | OUTPATIENT
Start: 2022-06-24

## 2022-06-24 RX ORDER — PROPOFOL 10 MG/ML
VIAL (ML) INTRAVENOUS AS NEEDED
Status: DISCONTINUED | OUTPATIENT
Start: 2022-06-24 | End: 2022-06-24 | Stop reason: SURG

## 2022-06-24 RX ORDER — SODIUM CHLORIDE, SODIUM LACTATE, POTASSIUM CHLORIDE, CALCIUM CHLORIDE 600; 310; 30; 20 MG/100ML; MG/100ML; MG/100ML; MG/100ML
INJECTION, SOLUTION INTRAVENOUS CONTINUOUS PRN
Status: DISCONTINUED | OUTPATIENT
Start: 2022-06-24 | End: 2022-06-24 | Stop reason: SURG

## 2022-06-24 RX ORDER — SODIUM CHLORIDE 0.9 % (FLUSH) 0.9 %
10 SYRINGE (ML) INJECTION EVERY 12 HOURS SCHEDULED
Status: ACTIVE | OUTPATIENT
Start: 2022-06-24

## 2022-06-24 RX ADMIN — PROPOFOL 30 MG: 10 INJECTION, EMULSION INTRAVENOUS at 11:04

## 2022-06-24 RX ADMIN — SODIUM CHLORIDE, POTASSIUM CHLORIDE, SODIUM LACTATE AND CALCIUM CHLORIDE: 600; 310; 30; 20 INJECTION, SOLUTION INTRAVENOUS at 10:04

## 2022-06-24 RX ADMIN — PROPOFOL 50 MG: 10 INJECTION, EMULSION INTRAVENOUS at 10:51

## 2022-06-24 RX ADMIN — PROPOFOL 50 MG: 10 INJECTION, EMULSION INTRAVENOUS at 10:53

## 2022-06-24 RX ADMIN — LIDOCAINE HYDROCHLORIDE 80 MG: 20 INJECTION, SOLUTION INFILTRATION; PERINEURAL at 10:53

## 2022-06-24 RX ADMIN — PROPOFOL 40 MG: 10 INJECTION, EMULSION INTRAVENOUS at 10:59

## 2022-06-24 RX ADMIN — PROPOFOL 50 MG: 10 INJECTION, EMULSION INTRAVENOUS at 10:48

## 2022-06-24 RX ADMIN — PROPOFOL 100 MG: 10 INJECTION, EMULSION INTRAVENOUS at 10:45

## 2022-06-24 NOTE — ANESTHESIA POSTPROCEDURE EVALUATION
Patient: Ash Licona    Procedure Summary     Date: 06/24/22 Room / Location: [facility] ENDOSCOPY 1 /  [facility] OR    Anesthesia Start: 1042 Anesthesia Stop: 1108    Procedure: COLONOSCOPY WITH POLYPECTOMY (N/A ) Diagnosis:       Encounter for screening for malignant neoplasm of colon      Diverticulosis      Colon polyp      (Encounter for screening for malignant neoplasm of colon [Z12.11])    Surgeons: Giovanny Davison MD Provider: Debbi Krishnan CRNA    Anesthesia Type: MAC ASA Status: 2          Anesthesia Type: MAC    Vitals  Vitals Value Taken Time   /76 06/24/22 1113   Temp 97.7 °F (36.5 °C) 06/24/22 1113   Pulse 70 06/24/22 1113   Resp 14 06/24/22 1113   SpO2 98 % 06/24/22 1113           Post Anesthesia Care and Evaluation    Patient location during evaluation: PHASE II  Patient participation: complete - patient participated  Level of consciousness: awake  Pain management: adequate    Airway patency: patent  Anesthetic complications: No anesthetic complications  PONV Status: none  Cardiovascular status: acceptable  Respiratory status: acceptable  Hydration status: acceptable

## 2022-06-24 NOTE — BRIEF OP NOTE
COLONOSCOPY WITH POLYPECTOMY  Progress Note    Ash Licona  6/24/2022    Pre-op Diagnosis:   Encounter for screening for malignant neoplasm of colon [Z12.11]       Post-Op Diagnosis Codes:     * Encounter for screening for malignant neoplasm of colon [Z12.11]     * Diverticulosis [K57.90]     * Colon polyp [K63.5]    Procedure/CPT® Codes:        Procedure(s):  COLONOSCOPY WITH POLYPECTOMY    Surgeon(s):  Giovanny Davison MD    Anesthesia: Monitored Anesthesia Care    Staff:   Circulator: Silvia Rubalcava RN  Scrub Person: Malu Tellez         Estimated Blood Loss: none    Urine Voided: * No values recorded between 6/24/2022 10:41 AM and 6/24/2022 11:06 AM *    Specimens:                Specimens     ID Source Type Tests Collected By Collected At Frozen?    A Large Intestine, Transverse Colon Polyp · TISSUE PATHOLOGY EXAM   Giovanny Davison MD 6/24/22 1100     Description: transverse x1    This specimen was not marked as sent.    B Large Intestine, Sigmoid Colon Polyp · TISSUE PATHOLOGY EXAM   Giovanny Davison MD 6/24/22 1103     Description: sigmoid x1    This specimen was not marked as sent.    C Large Intestine, Rectum Polyp · TISSUE PATHOLOGY EXAM   Giovanny Davison MD 6/24/22 1105     Description: rectal x1    This specimen was not marked as sent.                Drains: * No LDAs found *    Findings: Colon to TI good prep  Sigmoid Diverticulosis  Polyps-3-Biopsy        Complications: none          Giovanny Davison MD     Date: 6/24/2022  Time: 11:09 EDT       Onset:  30 min    Location/Description:  See below    Cramping/Contractions:  Contractions Q 10min for 30min. States has been having contractions all day but this is the closest they have been. Does not feel like she has to push. No apparent distress. Vaginal Drainage/Bleeding:  Light pink vag d/c for past 3 days    Fetus Active?  yes    Precipitating Factors:  none    Pain Scale (1-10), 10 highest: 8/10    Associated Symptoms: none    LMP: Patient's last menstrual period was 2020 (exact date). EDC:  2021  Gestational Age:  37w9d  Blood Type: A Rh Positive  OB History:    OB History    Para Term  AB Living   1 0 0 0 0 0   SAB TAB Ectopic Molar Multiple Live Births   0 0 0 0 0 0        Vaginal/C Section:  0/0  Group B Strep (pos or neg):  Negative  History of previous Labor & Delivery:  none  Recent visits (last 3-4 weeks) for same reason or recent surgery:  2020  Closed/-/-3  Did the patient have a positive coronavirus screening?: No    PLAN:  Home Care Advice provided    Patient/Caller agrees to follow recommendations.     Reason for Disposition  â¢ [1] First baby (primipara) AND [2] contractions > 5 minutes apart, or for < 2 hours     (all triage questions negative)    Protocols used: PREGNANCY - LABOR-A-

## 2022-06-24 NOTE — H&P
Patient Care Team:  Kimber Johnson APRN as PCP - General (Family Medicine)    CHIEF COMPLAINT: Screening CRC    HISTORY OF PRESENT ILLNESS:  First exam    Past Medical History:   Diagnosis Date   • Allergic    • Asthma    • Blood pressure elevated without history of HTN 2/26/2016   • Hypertension      History reviewed. No pertinent surgical history.  Family History   Problem Relation Age of Onset   • No Known Problems Mother    • Asthma Father    • Asthma Brother      Social History     Tobacco Use   • Smoking status: Former Smoker   • Smokeless tobacco: Never Used   Vaping Use   • Vaping Use: Never used   Substance Use Topics   • Alcohol use: Yes     Comment: socially    • Drug use: No     Medications Prior to Admission   Medication Sig Dispense Refill Last Dose   • ADVAIR DISKUS 100-50 MCG/DOSE DISKUS INL 1 PUFF PO BID  2 6/24/2022 at Unknown time   • albuterol (PROVENTIL HFA;VENTOLIN HFA) 108 (90 BASE) MCG/ACT inhaler Inhale 2 puffs Every 4 (Four) Hours As Needed for wheezing or shortness of air.   6/24/2022 at Unknown time   • hydroCHLOROthiazide (HYDRODIURIL) 25 MG tablet Take 1 tablet by mouth Daily. 90 tablet 1 6/22/2022   • losartan (Cozaar) 100 MG tablet Take 1 tablet by mouth Daily. 90 tablet 1 6/22/2022   • montelukast (SINGULAIR) 10 MG tablet Take  by mouth.   6/22/2022   • Scopolamine (Transderm-Scop, 1.5 MG,) 1 MG/3DAYS patch Place 1 patch on the skin as directed by provider Every 72 (Seventy-Two) Hours. 4 each 0 More than a month at Unknown time     Allergies:  Iodine    REVIEW OF SYSTEMS:  Please see the above history of present illness for pertinent positives and negatives.  The remainder of the patient's systems have been reviewed and are negative.     Vital Signs  Temp:  [98.3 °F (36.8 °C)] 98.3 °F (36.8 °C)  Heart Rate:  [71] 71  Resp:  [18] 18    Flowsheet Rows    Flowsheet Row First Filed Value   Admission Height --   Admission Weight 87.5 kg (192 lb 12.8 oz) Documented at 06/24/2022 0957            Physical Exam:  Physical Exam   Constitutional: Patient appears well-developed and well-nourished and in no acute distress   HEENT:   Head: Normocephalic and atraumatic.   Eyes:  Pupils are equal, round, and reactive to light. EOM are intact. Sclerae are anicteric and non-injected.  Mouth and Throat: Patient has moist mucous membranes. Oropharynx is clear of any erythema or exudate.     Neck: Neck supple. No JVD present. No thyromegaly present. No lymphadenopathy present.  Cardiovascular: Regular rate, regular rhythm, S1 normal and S2 normal.  Exam reveals no gallop and no friction rub.  No murmur heard.  Pulmonary/Chest: Lungs are clear to auscultation bilaterally. No respiratory distress. No wheezes. No rhonchi. No rales.   Abdominal: Soft. Bowel sounds are normal. No distension and no mass. There is no hepatosplenomegaly. There is no tenderness.   Musculoskeletal: Normal Muscle tone  Extremities: No edema. Pulses are palpable in all 4 extremities.  Neurological: Patient is alert and oriented to person, place, and time. Cranial nerves II-XII are grossly intact with no focal deficits.  Skin: Skin is warm. No rash noted. Nails show no clubbing.  No cyanosis or erythema.    Debilities/Disabilities Identified: None  Emotional Behavior: Appropriate     Results Review:   I reviewed the patient's new clinical results.    Lab Results (most recent)     None          Imaging Results (Most Recent)     None        reviewed    ECG/EMG Results (most recent)     None        reviewed    Assessment & Plan   Screening CRC/  colonoscopy      I discussed the patient's findings and my recommendations with patient.     Giovanny Davison MD  06/24/22  10:08 EDT    Time: 10 min prior to procedure.

## 2022-06-24 NOTE — ANESTHESIA PREPROCEDURE EVALUATION
Anesthesia Evaluation     Patient summary reviewed and Nursing notes reviewed   no history of anesthetic complications:  NPO Solid Status: > 8 hours  NPO Liquid Status: > 8 hours           Airway   Mallampati: II  TM distance: >3 FB  Neck ROM: full  No difficulty expected  Dental - normal exam     Pulmonary - normal exam    breath sounds clear to auscultation  (+) a smoker (quit in 2005) Former, asthma,  Cardiovascular   Exercise tolerance: excellent (>7 METS)    ECG reviewed  Rhythm: regular  Rate: normal    (+) hypertension well controlled less than 2 medications, hyperlipidemia,     ROS comment: Date/Time: 2/11/2021 3:07 PM  Performed by: Kimber Johnson APRN  Authorized by: Kimber Johnson APRN   Comparison: compared with previous ECG from 2/12/2020  Similar to previous ECG  Rhythm: sinus rhythm  Ectopy comments: none  Rate: normal  BPM: 68  Conduction: conduction normal  Conduction comments: ME 0.16 QRS 0.12  ST Segments: ST segments normal  T Waves: T waves normal  QRS axis: normal  Other: no other findings    Neuro/Psych  GI/Hepatic/Renal/Endo    (+)  GERD well controlled,      Musculoskeletal (-) negative ROS    Abdominal    Substance History   Alcohol use: rare.     OB/GYN negative ob/gyn ROS         Other - negative ROS                       Anesthesia Plan    ASA 2     MAC     intravenous induction     Anesthetic plan, risks, benefits, and alternatives have been provided, discussed and informed consent has been obtained with: patient.  Use of blood products discussed with patient  Consented to blood products.   Plan discussed with CRNA.        CODE STATUS:

## 2022-06-27 LAB
LAB AP CASE REPORT: NORMAL
PATH REPORT.FINAL DX SPEC: NORMAL
PATH REPORT.GROSS SPEC: NORMAL

## 2022-08-03 RX ORDER — HYDROCHLOROTHIAZIDE 25 MG/1
25 TABLET ORAL DAILY
Qty: 90 TABLET | Refills: 1 | Status: SHIPPED | OUTPATIENT
Start: 2022-08-03 | End: 2023-02-08

## 2022-08-03 RX ORDER — SCOLOPAMINE TRANSDERMAL SYSTEM 1 MG/1
1 PATCH, EXTENDED RELEASE TRANSDERMAL
Qty: 4 EACH | Refills: 0 | Status: SHIPPED | OUTPATIENT
Start: 2022-08-03 | End: 2022-08-17

## 2022-08-03 RX ORDER — LOSARTAN POTASSIUM 100 MG/1
100 TABLET ORAL DAILY
Qty: 90 TABLET | Refills: 1 | Status: SHIPPED | OUTPATIENT
Start: 2022-08-03 | End: 2023-02-08

## 2022-08-03 NOTE — TELEPHONE ENCOUNTER
Rx Refill Note  Requested Prescriptions     Pending Prescriptions Disp Refills    losartan (Cozaar) 100 MG tablet 90 tablet 1     Sig: Take 1 tablet by mouth Daily.    hydroCHLOROthiazide (HYDRODIURIL) 25 MG tablet 90 tablet 1     Sig: Take 1 tablet by mouth Daily.    Scopolamine (Transderm-Scop, 1.5 MG,) 1 MG/3DAYS patch 4 each 0     Sig: Place 1 patch on the skin as directed by provider Every 72 (Seventy-Two) Hours.      Last office visit with prescribing clinician: 3/29/2022      Next office visit with prescribing clinician: 8/17/2022            CARMELO SPENCE MA  08/03/22, 08:10 EDT

## 2022-08-13 LAB
ALBUMIN SERPL-MCNC: 4.7 G/DL (ref 3.8–4.9)
ALBUMIN/GLOB SERPL: 2 {RATIO} (ref 1.2–2.2)
ALP SERPL-CCNC: 59 IU/L (ref 44–121)
ALT SERPL-CCNC: 24 IU/L (ref 0–44)
AST SERPL-CCNC: 24 IU/L (ref 0–40)
BASOPHILS # BLD AUTO: 0.1 X10E3/UL (ref 0–0.2)
BASOPHILS NFR BLD AUTO: 1 %
BILIRUB SERPL-MCNC: 0.8 MG/DL (ref 0–1.2)
BUN SERPL-MCNC: 10 MG/DL (ref 6–24)
BUN/CREAT SERPL: 14 (ref 9–20)
CALCIUM SERPL-MCNC: 9.4 MG/DL (ref 8.7–10.2)
CHLORIDE SERPL-SCNC: 96 MMOL/L (ref 96–106)
CHOLEST SERPL-MCNC: 181 MG/DL (ref 100–199)
CHOLEST/HDLC SERPL: 4 RATIO (ref 0–5)
CO2 SERPL-SCNC: 24 MMOL/L (ref 20–29)
CREAT SERPL-MCNC: 0.71 MG/DL (ref 0.76–1.27)
EGFRCR SERPLBLD CKD-EPI 2021: 110 ML/MIN/1.73
EOSINOPHIL # BLD AUTO: 0.2 X10E3/UL (ref 0–0.4)
EOSINOPHIL NFR BLD AUTO: 3 %
ERYTHROCYTE [DISTWIDTH] IN BLOOD BY AUTOMATED COUNT: 13.9 % (ref 11.6–15.4)
GLOBULIN SER CALC-MCNC: 2.4 G/DL (ref 1.5–4.5)
GLUCOSE SERPL-MCNC: 91 MG/DL (ref 65–99)
HCT VFR BLD AUTO: 46.4 % (ref 37.5–51)
HDLC SERPL-MCNC: 45 MG/DL
HGB BLD-MCNC: 15.4 G/DL (ref 13–17.7)
IMM GRANULOCYTES # BLD AUTO: 0 X10E3/UL (ref 0–0.1)
IMM GRANULOCYTES NFR BLD AUTO: 0 %
LDLC SERPL CALC-MCNC: 118 MG/DL (ref 0–99)
LYMPHOCYTES # BLD AUTO: 1.8 X10E3/UL (ref 0.7–3.1)
LYMPHOCYTES NFR BLD AUTO: 30 %
MCH RBC QN AUTO: 28.9 PG (ref 26.6–33)
MCHC RBC AUTO-ENTMCNC: 33.2 G/DL (ref 31.5–35.7)
MCV RBC AUTO: 87 FL (ref 79–97)
MONOCYTES # BLD AUTO: 0.4 X10E3/UL (ref 0.1–0.9)
MONOCYTES NFR BLD AUTO: 7 %
NEUTROPHILS # BLD AUTO: 3.4 X10E3/UL (ref 1.4–7)
NEUTROPHILS NFR BLD AUTO: 59 %
PLATELET # BLD AUTO: 289 X10E3/UL (ref 150–450)
POTASSIUM SERPL-SCNC: 4 MMOL/L (ref 3.5–5.2)
PROT SERPL-MCNC: 7.1 G/DL (ref 6–8.5)
PSA SERPL-MCNC: 0.7 NG/ML (ref 0–4)
RBC # BLD AUTO: 5.33 X10E6/UL (ref 4.14–5.8)
SODIUM SERPL-SCNC: 135 MMOL/L (ref 134–144)
TRIGL SERPL-MCNC: 100 MG/DL (ref 0–149)
VLDLC SERPL CALC-MCNC: 18 MG/DL (ref 5–40)
WBC # BLD AUTO: 5.8 X10E3/UL (ref 3.4–10.8)

## 2022-08-17 ENCOUNTER — OFFICE VISIT (OUTPATIENT)
Dept: INTERNAL MEDICINE | Facility: CLINIC | Age: 52
End: 2022-08-17

## 2022-08-17 VITALS
TEMPERATURE: 98.1 F | WEIGHT: 199.4 LBS | HEIGHT: 72 IN | SYSTOLIC BLOOD PRESSURE: 128 MMHG | BODY MASS INDEX: 27.01 KG/M2 | HEART RATE: 71 BPM | DIASTOLIC BLOOD PRESSURE: 90 MMHG | OXYGEN SATURATION: 96 %

## 2022-08-17 DIAGNOSIS — I10 ESSENTIAL HYPERTENSION: Primary | ICD-10-CM

## 2022-08-17 DIAGNOSIS — E78.2 MIXED HYPERLIPIDEMIA: ICD-10-CM

## 2022-08-17 PROCEDURE — 99213 OFFICE O/P EST LOW 20 MIN: CPT | Performed by: NURSE PRACTITIONER

## 2022-08-17 RX ORDER — TESTOSTERONE CYPIONATE 200 MG/ML
INJECTION, SOLUTION INTRAMUSCULAR
COMMUNITY
Start: 2022-07-06

## 2022-08-17 NOTE — PROGRESS NOTES
Chief Complaint   Patient presents with   • Hypertension     Follow Up Labs       Subjective     Ash Licona is a 52 y.o. male being seen for a follow up appointment today regarding HTN. He is maico losartan HCTZ 100/25mg daily. He is hiking and cycling for exercise.     History of Present Illness     Allergies   Allergen Reactions   • Iodine Hives         Current Outpatient Medications:   •  ADVAIR DISKUS 100-50 MCG/DOSE DISKUS, INL 1 PUFF PO BID, Disp: , Rfl: 2  •  albuterol (PROVENTIL HFA;VENTOLIN HFA) 108 (90 BASE) MCG/ACT inhaler, Inhale 2 puffs Every 4 (Four) Hours As Needed for wheezing or shortness of air., Disp: , Rfl:   •  hydroCHLOROthiazide (HYDRODIURIL) 25 MG tablet, Take 1 tablet by mouth Daily., Disp: 90 tablet, Rfl: 1  •  losartan (Cozaar) 100 MG tablet, Take 1 tablet by mouth Daily., Disp: 90 tablet, Rfl: 1  •  montelukast (SINGULAIR) 10 MG tablet, Take  by mouth., Disp: , Rfl:   •  Scopolamine (Transderm-Scop, 1.5 MG,) 1 MG/3DAYS patch, Place 1 patch on the skin as directed by provider Every 72 (Seventy-Two) Hours., Disp: 4 each, Rfl: 0  •  Testosterone Cypionate (DEPOTESTOTERONE CYPIONATE) 200 MG/ML injection, , Disp: , Rfl:   No current facility-administered medications for this visit.    Facility-Administered Medications Ordered in Other Visits:   •  lactated ringers infusion, 9 mL/hr, Intravenous, Continuous, Debbi Krishnan CRNA  •  lactated ringers infusion, 100 mL/hr, Intravenous, Continuous, Debbi Krishnan CRNA  •  lidocaine PF 1% (XYLOCAINE) injection 0.5 mL, 0.5 mL, Injection, Once PRN, Debbi Krishnan CRNA  •  ondansetron (ZOFRAN) injection 4 mg, 4 mg, Intravenous, Once PRN, Debbi Krishnan CRNA  •  sodium chloride 0.9 % flush 10 mL, 10 mL, Intravenous, PRN, Debbi Krishnan CRNA  •  sodium chloride 0.9 % flush 10 mL, 10 mL, Intravenous, Q12H, Debbi Krishnan CRNA  •  sodium chloride 0.9 % infusion 40 mL, 40 mL, Intravenous, PRN, Debbi Krishnan CRNA    The following portions of the  patient's history were reviewed and updated as appropriate: allergies, current medications, past family history, past medical history, past social history, past surgical history and problem list.    Review of Systems   Constitutional: Negative.    HENT: Negative.    Eyes: Negative.    Respiratory: Negative.  Negative for shortness of breath and stridor.    Cardiovascular: Negative for chest pain, palpitations and leg swelling.   Gastrointestinal: Negative.    Endocrine: Negative.    Genitourinary: Negative.    Musculoskeletal: Negative.  Negative for back pain.   Skin: Negative.    Neurological: Negative.    Hematological: Negative.  Negative for adenopathy. Does not bruise/bleed easily.   Psychiatric/Behavioral: Negative.  Negative for agitation.   All other systems reviewed and are negative.      Assessment     Physical Exam  Vitals reviewed.   Constitutional:       Appearance: Normal appearance. He is not ill-appearing.   Cardiovascular:      Rate and Rhythm: Normal rate and regular rhythm.      Pulses: Normal pulses.   Pulmonary:      Effort: Pulmonary effort is normal. No respiratory distress.      Breath sounds: Normal breath sounds.   Musculoskeletal:      Right lower leg: No edema.      Left lower leg: No edema.   Skin:     General: Skin is warm and dry.   Neurological:      General: No focal deficit present.      Mental Status: He is alert and oriented to person, place, and time.   Psychiatric:         Mood and Affect: Mood normal.         Behavior: Behavior normal.         Thought Content: Thought content normal.         Plan     His fasting labs were reviewed with the patient from last week.     Diagnoses and all orders for this visit:    1. Essential hypertension (Primary)  -     Comprehensive Metabolic Panel; Future  -     Lipid Panel With / Chol / HDL Ratio; Future    2. Mixed hyperlipidemia  -     Comprehensive Metabolic Panel; Future  -     Lipid Panel With / Chol / HDL Ratio; Future    BP at goal on  Losartan 100mg and HCTZ 25mg daily.    Follow up in 6 months w labs

## 2022-09-26 ENCOUNTER — TELEPHONE (OUTPATIENT)
Dept: INTERNAL MEDICINE | Facility: CLINIC | Age: 52
End: 2022-09-26

## 2022-09-26 NOTE — TELEPHONE ENCOUNTER
Caller: Ash Licona    Relationship to patient: Self    Best call back number: [phone]    Chief complaint: PATIENT WENT HIKING YESTERDAY AND THINKS HE TOUCHED SOMETHING POISONOUS. TODAY HIS FACE IS SWOLLEN AND TIGHT, RED AND EXTREMELY ITCHY WITH RED BUMPS ALL OVER IT. PATIENT WAS ADVISED TO GO TO ER DUE TO SYMPTOMS AND HE STATED HE WOULD GO TO URGENT CARE OR THE ER.     Patient directed to call 911 or go to their nearest emergency room.     Patient verbalized understanding: [x] Yes  [] No  If no, why?

## 2022-12-22 ENCOUNTER — TRANSCRIBE ORDERS (OUTPATIENT)
Dept: ADMINISTRATIVE | Facility: HOSPITAL | Age: 52
End: 2022-12-22

## 2022-12-22 DIAGNOSIS — R31.21 ASYMPTOMATIC MICROSCOPIC HEMATURIA: Primary | ICD-10-CM

## 2023-01-03 ENCOUNTER — HOSPITAL ENCOUNTER (OUTPATIENT)
Dept: CT IMAGING | Facility: HOSPITAL | Age: 53
Discharge: HOME | End: 2023-01-03
Admitting: UROLOGY
Payer: COMMERCIAL

## 2023-01-03 DIAGNOSIS — R31.21 ASYMPTOMATIC MICROSCOPIC HEMATURIA: ICD-10-CM

## 2023-01-03 PROCEDURE — 0 IOPAMIDOL PER 1 ML: Performed by: UROLOGY

## 2023-01-03 PROCEDURE — 74178 CT ABD&PLV WO CNTR FLWD CNTR: CPT

## 2023-01-03 RX ADMIN — IOPAMIDOL 100 ML: 755 INJECTION, SOLUTION INTRAVENOUS at 15:02

## 2023-02-08 RX ORDER — HYDROCHLOROTHIAZIDE 25 MG/1
TABLET ORAL
Qty: 90 TABLET | Refills: 0 | Status: SHIPPED | OUTPATIENT
Start: 2023-02-08

## 2023-02-08 RX ORDER — LOSARTAN POTASSIUM 100 MG/1
TABLET ORAL
Qty: 90 TABLET | Refills: 0 | Status: SHIPPED | OUTPATIENT
Start: 2023-02-08

## 2023-04-10 ENCOUNTER — OFFICE VISIT (OUTPATIENT)
Dept: INTERNAL MEDICINE | Facility: CLINIC | Age: 53
End: 2023-04-10
Payer: COMMERCIAL

## 2023-04-10 VITALS
BODY MASS INDEX: 27.09 KG/M2 | OXYGEN SATURATION: 97 % | SYSTOLIC BLOOD PRESSURE: 142 MMHG | HEART RATE: 85 BPM | WEIGHT: 200 LBS | HEIGHT: 72 IN | TEMPERATURE: 98 F | DIASTOLIC BLOOD PRESSURE: 98 MMHG

## 2023-04-10 DIAGNOSIS — I10 ESSENTIAL HYPERTENSION: ICD-10-CM

## 2023-04-10 DIAGNOSIS — Z00.00 HEALTH CARE MAINTENANCE: Primary | ICD-10-CM

## 2023-04-10 PROCEDURE — 99396 PREV VISIT EST AGE 40-64: CPT | Performed by: NURSE PRACTITIONER

## 2023-04-10 NOTE — PROGRESS NOTES
"Annual Exam      Ash Licona is being seen for a Complete physical exam. His last physical was 2-.     Social: He is working full time for [employer]. He is  to Omkar and has 3 children. His household includes his wife.      Lifestyle: He is exercising regularly, hiking and trail running. He does not us e tobacco, smoked for 15 ears, quit 2005. He drinks alcohol once a month or less.    Screening: Colonoscopy was completed 6- with Dr. Davison. Last labs reviewed from 3- included a lipid panel, and CMP. CBC and PSA completed 8 months ago 8-.      History of Present Illness       The following portions of the patient's history were reviewed and updated as appropriate: allergies, current medications, past family history, past medical history, past social history, past surgical history and problem list.    Review of Systems   Constitutional: Negative.    HENT: Negative.    Eyes: Negative.    Respiratory: Negative.    Cardiovascular: Negative.    Gastrointestinal: Negative.    Endocrine: Negative.    Genitourinary: Negative.    Musculoskeletal: Negative.    Skin: Negative.    Allergic/Immunologic: Negative.    Neurological: Negative.    Hematological: Negative.    Psychiatric/Behavioral: Negative.    All other systems reviewed and are negative.      Objective          Ht 182.9 cm (72\")   Wt 90.7 kg (200 lb)   BMI 27.12 kg/m²     General Appearance:    Alert, cooperative, no distress, appears stated age   Head:    Normocephalic, without obvious abnormality, atraumatic   Eyes:    PERRL, conjunctiva/corneas clear, EOM's intact, fundi     benign, both eyes        Ears:    Normal TM's and external ear canals, both ears   Nose:   Nares normal, septum midline, mucosa normal, no drainage    or sinus tenderness   Throat:   Lips, mucosa, and tongue normal; teeth and gums normal   Neck:   Supple, symmetrical, trachea midline, no adenopathy;        thyroid:  No enlargement/tenderness/nodules; no " carotid    bruit or JVD   Back:     Symmetric, no curvature, ROM normal, no CVA tenderness   Lungs:     Clear to auscultation bilaterally, respirations unlabored   Chest wall:    No tenderness or deformity   Heart:    Regular rate and rhythm, S1 and S2 normal, no murmur, rub    or gallop   Abdomen:     Soft, non-tender, bowel sounds active all four quadrants,     no masses, no organomegaly   Genitalia:    deferred   Rectal:    deferred   Extremities:   Extremities normal, atraumatic, no cyanosis or edema   Pulses:   2+ and symmetric all extremities   Skin:   Skin color, texture, turgor normal, no rashes or lesions   Lymph nodes:   Cervical, supraclavicular, and axillary nodes normal   Neurologic:   CNII-XII intact. Normal strength, sensation and reflexes      throughout              Assessment & Plan   Diagnoses and all orders for this visit:    1. Health care maintenance (Primary)    2. Essential hypertension        Preventive [place]: Discussed exercise regimen, is adequate. He will keep a BP log, and call with results in 3-4 weeks. Vaccines up to date. Reduce sugar in diet due to baking.     Follow up in 6 months

## 2023-04-25 ENCOUNTER — LAB (OUTPATIENT)
Dept: LAB | Facility: HOSPITAL | Age: 53
End: 2023-04-25
Payer: COMMERCIAL

## 2023-04-25 ENCOUNTER — TRANSCRIBE ORDERS (OUTPATIENT)
Dept: ADMINISTRATIVE | Facility: HOSPITAL | Age: 53
End: 2023-04-25
Payer: COMMERCIAL

## 2023-04-25 DIAGNOSIS — E29.1 3-OXO-5 ALPHA-STEROID DELTA 4-DEHYDROGENASE DEFICIENCY: ICD-10-CM

## 2023-04-25 DIAGNOSIS — R68.82 DECREASED LIBIDO: ICD-10-CM

## 2023-04-25 DIAGNOSIS — E29.1 3-OXO-5 ALPHA-STEROID DELTA 4-DEHYDROGENASE DEFICIENCY: Primary | ICD-10-CM

## 2023-04-25 LAB
ALBUMIN SERPL-MCNC: 4.7 G/DL (ref 3.5–5.2)
ALBUMIN/GLOB SERPL: 1.7 G/DL
ALP SERPL-CCNC: 55 U/L (ref 39–117)
ALT SERPL W P-5'-P-CCNC: 36 U/L (ref 1–41)
ANION GAP SERPL CALCULATED.3IONS-SCNC: 13.4 MMOL/L (ref 5–15)
AST SERPL-CCNC: 33 U/L (ref 1–40)
BASOPHILS # BLD AUTO: 0.07 10*3/MM3 (ref 0–0.2)
BASOPHILS NFR BLD AUTO: 1.1 % (ref 0–1.5)
BILIRUB SERPL-MCNC: 0.5 MG/DL (ref 0–1.2)
BUN SERPL-MCNC: 9 MG/DL (ref 6–20)
BUN/CREAT SERPL: 10.8 (ref 7–25)
CALCIUM SPEC-SCNC: 9.8 MG/DL (ref 8.6–10.5)
CHLORIDE SERPL-SCNC: 97 MMOL/L (ref 98–107)
CO2 SERPL-SCNC: 26.6 MMOL/L (ref 22–29)
CREAT SERPL-MCNC: 0.83 MG/DL (ref 0.76–1.27)
DEPRECATED RDW RBC AUTO: 41.2 FL (ref 37–54)
EGFRCR SERPLBLD CKD-EPI 2021: 104.7 ML/MIN/1.73
EOSINOPHIL # BLD AUTO: 0.17 10*3/MM3 (ref 0–0.4)
EOSINOPHIL NFR BLD AUTO: 2.8 % (ref 0.3–6.2)
ERYTHROCYTE [DISTWIDTH] IN BLOOD BY AUTOMATED COUNT: 12.7 % (ref 12.3–15.4)
GLOBULIN UR ELPH-MCNC: 2.7 GM/DL
GLUCOSE SERPL-MCNC: 132 MG/DL (ref 65–99)
HCT VFR BLD AUTO: 53.5 % (ref 37.5–51)
HGB BLD-MCNC: 18 G/DL (ref 13–17.7)
IMM GRANULOCYTES # BLD AUTO: 0.02 10*3/MM3 (ref 0–0.05)
IMM GRANULOCYTES NFR BLD AUTO: 0.3 % (ref 0–0.5)
LYMPHOCYTES # BLD AUTO: 1.85 10*3/MM3 (ref 0.7–3.1)
LYMPHOCYTES NFR BLD AUTO: 30 % (ref 19.6–45.3)
MCH RBC QN AUTO: 29.6 PG (ref 26.6–33)
MCHC RBC AUTO-ENTMCNC: 33.6 G/DL (ref 31.5–35.7)
MCV RBC AUTO: 88 FL (ref 79–97)
MONOCYTES # BLD AUTO: 0.31 10*3/MM3 (ref 0.1–0.9)
MONOCYTES NFR BLD AUTO: 5 % (ref 5–12)
NEUTROPHILS NFR BLD AUTO: 3.74 10*3/MM3 (ref 1.7–7)
NEUTROPHILS NFR BLD AUTO: 60.8 % (ref 42.7–76)
NRBC BLD AUTO-RTO: 0 /100 WBC (ref 0–0.2)
PLATELET # BLD AUTO: 255 10*3/MM3 (ref 140–450)
PMV BLD AUTO: 9.2 FL (ref 6–12)
POTASSIUM SERPL-SCNC: 4 MMOL/L (ref 3.5–5.2)
PROT SERPL-MCNC: 7.4 G/DL (ref 6–8.5)
RBC # BLD AUTO: 6.08 10*6/MM3 (ref 4.14–5.8)
SODIUM SERPL-SCNC: 137 MMOL/L (ref 136–145)
TESTOST SERPL-MCNC: 1037 NG/DL (ref 193–740)
WBC NRBC COR # BLD: 6.16 10*3/MM3 (ref 3.4–10.8)

## 2023-04-25 PROCEDURE — 84154 ASSAY OF PSA FREE: CPT

## 2023-04-25 PROCEDURE — 36415 COLL VENOUS BLD VENIPUNCTURE: CPT

## 2023-04-25 PROCEDURE — 84153 ASSAY OF PSA TOTAL: CPT

## 2023-04-25 PROCEDURE — 80053 COMPREHEN METABOLIC PANEL: CPT

## 2023-04-25 PROCEDURE — 85025 COMPLETE CBC W/AUTO DIFF WBC: CPT

## 2023-04-25 PROCEDURE — 84403 ASSAY OF TOTAL TESTOSTERONE: CPT

## 2023-04-26 LAB
PSA FREE MFR SERPL: 51.4 %
PSA FREE SERPL-MCNC: 0.36 NG/ML
PSA SERPL-MCNC: 0.7 NG/ML (ref 0–4)

## 2023-05-12 RX ORDER — HYDROCHLOROTHIAZIDE 25 MG/1
TABLET ORAL
Qty: 90 TABLET | Refills: 0 | Status: SHIPPED | OUTPATIENT
Start: 2023-05-12

## 2023-05-12 RX ORDER — LOSARTAN POTASSIUM 100 MG/1
TABLET ORAL
Qty: 90 TABLET | Refills: 0 | Status: SHIPPED | OUTPATIENT
Start: 2023-05-12

## 2023-06-23 NOTE — TELEPHONE ENCOUNTER
Pt informed of medication sent in to [facility].     [Time Spent: ___ minutes] : I have spent [unfilled] minutes of time on the encounter.

## 2023-08-09 RX ORDER — HYDROCHLOROTHIAZIDE 25 MG/1
TABLET ORAL
Qty: 90 TABLET | Refills: 0 | OUTPATIENT
Start: 2023-08-09

## 2023-08-09 RX ORDER — LOSARTAN POTASSIUM 100 MG/1
TABLET ORAL
Qty: 90 TABLET | Refills: 0 | OUTPATIENT
Start: 2023-08-09

## 2023-08-09 RX ORDER — LOSARTAN POTASSIUM 100 MG/1
100 TABLET ORAL DAILY
Qty: 90 TABLET | Refills: 0 | Status: SHIPPED | OUTPATIENT
Start: 2023-08-09

## 2023-08-09 RX ORDER — HYDROCHLOROTHIAZIDE 25 MG/1
25 TABLET ORAL DAILY
Qty: 90 TABLET | Refills: 0 | Status: SHIPPED | OUTPATIENT
Start: 2023-08-09

## 2023-10-10 ENCOUNTER — OFFICE VISIT (OUTPATIENT)
Dept: INTERNAL MEDICINE | Facility: CLINIC | Age: 53
End: 2023-10-10
Payer: COMMERCIAL

## 2023-10-10 VITALS
WEIGHT: 209 LBS | SYSTOLIC BLOOD PRESSURE: 140 MMHG | HEART RATE: 75 BPM | TEMPERATURE: 98.2 F | DIASTOLIC BLOOD PRESSURE: 88 MMHG | OXYGEN SATURATION: 98 % | HEIGHT: 72 IN | BODY MASS INDEX: 28.31 KG/M2

## 2023-10-10 DIAGNOSIS — R06.83 SNORING: ICD-10-CM

## 2023-10-10 DIAGNOSIS — Z78.9 TAKES DIETARY SUPPLEMENTS: ICD-10-CM

## 2023-10-10 DIAGNOSIS — I10 PRIMARY HYPERTENSION: Primary | ICD-10-CM

## 2023-10-10 DIAGNOSIS — R06.81 WITNESSED APNEIC SPELLS: ICD-10-CM

## 2023-10-10 PROCEDURE — 99214 OFFICE O/P EST MOD 30 MIN: CPT | Performed by: NURSE PRACTITIONER

## 2023-10-10 RX ORDER — AMLODIPINE AND VALSARTAN 5; 320 MG/1; MG/1
1 TABLET ORAL DAILY
Qty: 90 TABLET | Refills: 1 | Status: SHIPPED | OUTPATIENT
Start: 2023-10-10 | End: 2024-10-09

## 2023-10-11 LAB
25(OH)D3+25(OH)D2 SERPL-MCNC: 32.6 NG/ML (ref 30–100)
ALBUMIN SERPL-MCNC: 5 G/DL (ref 3.8–4.9)
ALBUMIN/GLOB SERPL: 1.6 {RATIO} (ref 1.2–2.2)
ALP SERPL-CCNC: 56 IU/L (ref 44–121)
ALT SERPL-CCNC: 39 IU/L (ref 0–44)
AST SERPL-CCNC: 40 IU/L (ref 0–40)
BILIRUB SERPL-MCNC: 0.6 MG/DL (ref 0–1.2)
BUN SERPL-MCNC: 10 MG/DL (ref 6–24)
BUN/CREAT SERPL: 13 (ref 9–20)
CALCIUM SERPL-MCNC: 10 MG/DL (ref 8.7–10.2)
CHLORIDE SERPL-SCNC: 91 MMOL/L (ref 96–106)
CHOLEST SERPL-MCNC: 200 MG/DL (ref 100–199)
CO2 SERPL-SCNC: 26 MMOL/L (ref 20–29)
CREAT SERPL-MCNC: 0.78 MG/DL (ref 0.76–1.27)
EGFRCR SERPLBLD CKD-EPI 2021: 106 ML/MIN/1.73
GLOBULIN SER CALC-MCNC: 3.1 G/DL (ref 1.5–4.5)
GLUCOSE SERPL-MCNC: 86 MG/DL (ref 70–99)
HDLC SERPL-MCNC: 40 MG/DL
LDLC SERPL CALC-MCNC: 106 MG/DL (ref 0–99)
LDLC/HDLC SERPL: 2.7 RATIO (ref 0–3.6)
POTASSIUM SERPL-SCNC: 4 MMOL/L (ref 3.5–5.2)
PROT SERPL-MCNC: 8.1 G/DL (ref 6–8.5)
SODIUM SERPL-SCNC: 134 MMOL/L (ref 134–144)
TRIGL SERPL-MCNC: 319 MG/DL (ref 0–149)
VLDLC SERPL CALC-MCNC: 54 MG/DL (ref 5–40)

## 2023-11-01 ENCOUNTER — TELEPHONE (OUTPATIENT)
Dept: INTERNAL MEDICINE | Facility: CLINIC | Age: 53
End: 2023-11-01
Payer: COMMERCIAL

## 2023-11-09 DIAGNOSIS — E78.5 HYPERLIPIDEMIA, UNSPECIFIED HYPERLIPIDEMIA TYPE: ICD-10-CM

## 2023-11-09 DIAGNOSIS — E78.5 HYPERLIPIDEMIA, UNSPECIFIED HYPERLIPIDEMIA TYPE: Primary | ICD-10-CM

## 2023-11-10 LAB
ALBUMIN SERPL-MCNC: 4.9 G/DL (ref 3.5–5.2)
ALBUMIN/GLOB SERPL: 2.1 G/DL
ALP SERPL-CCNC: 54 U/L (ref 39–117)
ALT SERPL-CCNC: 22 U/L (ref 1–41)
AST SERPL-CCNC: 29 U/L (ref 1–40)
BASOPHILS # BLD AUTO: 0.06 10*3/MM3 (ref 0–0.2)
BASOPHILS NFR BLD AUTO: 1.1 % (ref 0–1.5)
BILIRUB SERPL-MCNC: 0.6 MG/DL (ref 0–1.2)
BUN SERPL-MCNC: 9 MG/DL (ref 6–20)
BUN/CREAT SERPL: 11 (ref 7–25)
CALCIUM SERPL-MCNC: 9.4 MG/DL (ref 8.6–10.5)
CHLORIDE SERPL-SCNC: 101 MMOL/L (ref 98–107)
CHOLEST SERPL-MCNC: 158 MG/DL (ref 0–200)
CHOLEST/HDLC SERPL: 4.27 {RATIO}
CO2 SERPL-SCNC: 26 MMOL/L (ref 22–29)
CREAT SERPL-MCNC: 0.82 MG/DL (ref 0.76–1.27)
EGFRCR SERPLBLD CKD-EPI 2021: 104.4 ML/MIN/1.73
EOSINOPHIL # BLD AUTO: 0.12 10*3/MM3 (ref 0–0.4)
EOSINOPHIL NFR BLD AUTO: 2.3 % (ref 0.3–6.2)
ERYTHROCYTE [DISTWIDTH] IN BLOOD BY AUTOMATED COUNT: 13.5 % (ref 12.3–15.4)
GLOBULIN SER CALC-MCNC: 2.3 GM/DL
GLUCOSE SERPL-MCNC: 97 MG/DL (ref 65–99)
HBA1C MFR BLD: 5.7 % (ref 4.8–5.6)
HCT VFR BLD AUTO: 49.1 % (ref 37.5–51)
HDLC SERPL-MCNC: 37 MG/DL (ref 40–60)
HGB BLD-MCNC: 16.3 G/DL (ref 13–17.7)
IMM GRANULOCYTES # BLD AUTO: 0.01 10*3/MM3 (ref 0–0.05)
IMM GRANULOCYTES NFR BLD AUTO: 0.2 % (ref 0–0.5)
LDLC SERPL CALC-MCNC: 104 MG/DL (ref 0–100)
LYMPHOCYTES # BLD AUTO: 1.63 10*3/MM3 (ref 0.7–3.1)
LYMPHOCYTES NFR BLD AUTO: 30.9 % (ref 19.6–45.3)
MCH RBC QN AUTO: 29.3 PG (ref 26.6–33)
MCHC RBC AUTO-ENTMCNC: 33.2 G/DL (ref 31.5–35.7)
MCV RBC AUTO: 88.2 FL (ref 79–97)
MONOCYTES # BLD AUTO: 0.35 10*3/MM3 (ref 0.1–0.9)
MONOCYTES NFR BLD AUTO: 6.6 % (ref 5–12)
NEUTROPHILS # BLD AUTO: 3.11 10*3/MM3 (ref 1.7–7)
NEUTROPHILS NFR BLD AUTO: 58.9 % (ref 42.7–76)
NRBC BLD AUTO-RTO: 0 /100 WBC (ref 0–0.2)
PLATELET # BLD AUTO: 275 10*3/MM3 (ref 140–450)
POTASSIUM SERPL-SCNC: 4.3 MMOL/L (ref 3.5–5.2)
PROT SERPL-MCNC: 7.2 G/DL (ref 6–8.5)
RBC # BLD AUTO: 5.57 10*6/MM3 (ref 4.14–5.8)
SODIUM SERPL-SCNC: 137 MMOL/L (ref 136–145)
TRIGL SERPL-MCNC: 89 MG/DL (ref 0–150)
TSH SERPL DL<=0.005 MIU/L-ACNC: 0.75 UIU/ML (ref 0.27–4.2)
VIT B12 SERPL-MCNC: 1668 PG/ML (ref 211–946)
VLDLC SERPL CALC-MCNC: 17 MG/DL (ref 5–40)
WBC # BLD AUTO: 5.28 10*3/MM3 (ref 3.4–10.8)

## 2023-11-21 ENCOUNTER — OFFICE VISIT (OUTPATIENT)
Dept: INTERNAL MEDICINE | Facility: CLINIC | Age: 53
End: 2023-11-21
Payer: COMMERCIAL

## 2023-11-21 VITALS
HEART RATE: 85 BPM | WEIGHT: 204.6 LBS | BODY MASS INDEX: 27.71 KG/M2 | OXYGEN SATURATION: 99 % | SYSTOLIC BLOOD PRESSURE: 126 MMHG | DIASTOLIC BLOOD PRESSURE: 86 MMHG | HEIGHT: 72 IN | TEMPERATURE: 98.6 F

## 2023-11-21 DIAGNOSIS — R73.09 ABNORMAL GLUCOSE: ICD-10-CM

## 2023-11-21 DIAGNOSIS — I10 PRIMARY HYPERTENSION: Primary | ICD-10-CM

## 2023-11-21 PROCEDURE — 99213 OFFICE O/P EST LOW 20 MIN: CPT | Performed by: NURSE PRACTITIONER

## 2023-11-21 NOTE — PROGRESS NOTES
Chief Complaint   Patient presents with    Hypertension     Follow up       Subjective     Ash Licona is a 53 y.o. male being seen for a follow up appointment today regarding HTN. He was in the office last month and his losartan was switched to Exforge /5mg due to poorly controlled BP. Bp at home 146/102 the first week, but then rechecks were 117/91. He i tolerating this well, less urinary frequency. He denies edema in legs.       History of Present Illness     Allergies   Allergen Reactions    Iodine Hives and Unknown - Low Severity         Current Outpatient Medications:     ADVAIR DISKUS 100-50 MCG/DOSE DISKUS, INL 1 PUFF PO BID, Disp: , Rfl: 2    albuterol (PROVENTIL HFA;VENTOLIN HFA) 108 (90 BASE) MCG/ACT inhaler, Inhale 2 puffs Every 4 (Four) Hours As Needed for Wheezing or Shortness of Air., Disp: , Rfl:     amLODIPine-valsartan (Exforge) 5-320 MG per tablet, Take 1 tablet by mouth Daily., Disp: 90 tablet, Rfl: 1    montelukast (SINGULAIR) 10 MG tablet, Take  by mouth., Disp: , Rfl:     Testosterone Cypionate (DEPOTESTOTERONE CYPIONATE) 200 MG/ML injection, , Disp: , Rfl:         Review of Systems   Constitutional: Negative.    HENT: Negative.     Eyes: Negative.    Respiratory: Negative.  Negative for shortness of breath, wheezing and stridor.    Cardiovascular: Negative.  Negative for chest pain, palpitations and leg swelling.   Gastrointestinal: Negative.    Endocrine: Negative.    Musculoskeletal: Negative.    Allergic/Immunologic: Negative.  Negative for environmental allergies.   Neurological: Negative.    Hematological: Negative.  Negative for adenopathy.   Psychiatric/Behavioral: Negative.     All other systems reviewed and are negative.      Assessment     Physical Exam  Vitals reviewed.   Constitutional:       Appearance: Normal appearance.   HENT:      Right Ear: Tympanic membrane normal.      Left Ear: Tympanic membrane normal.   Cardiovascular:      Rate and Rhythm: Normal rate and  regular rhythm.      Pulses: Normal pulses.      Heart sounds: Normal heart sounds. No murmur heard.  Pulmonary:      Effort: Pulmonary effort is normal. No respiratory distress.      Breath sounds: Normal breath sounds.   Musculoskeletal:      Right lower leg: No edema.      Left lower leg: No edema.   Skin:     General: Skin is warm and dry.   Neurological:      General: No focal deficit present.      Mental Status: He is alert and oriented to person, place, and time.   Psychiatric:         Mood and Affect: Mood normal.         Behavior: Behavior normal.         Thought Content: Thought content normal.         Plan     His fasting labs were reviewed with the patient from last week.     Diagnoses and all orders for this visit:    1. Primary hypertension (Primary)  -     Comprehensive Metabolic Panel; Future  -     Lipid Panel With / Chol / HDL Ratio; Future    2. Abnormal glucose  Comments:  diet and activity related secondary to Halloween candy, repeat in 3 months  Orders:  -     Comprehensive Metabolic Panel; Future  -     Hemoglobin A1c; Future    BP at goal. Continue Valsartan 320/5mg daily for HTN. Check BP once weekly    Follow up in 3 months w labs

## 2023-11-29 ENCOUNTER — OFFICE VISIT (OUTPATIENT)
Dept: SLEEP MEDICINE | Facility: HOSPITAL | Age: 53
End: 2023-11-29
Payer: COMMERCIAL

## 2023-11-29 VITALS
BODY MASS INDEX: 27.9 KG/M2 | HEIGHT: 72 IN | OXYGEN SATURATION: 96 % | WEIGHT: 206 LBS | SYSTOLIC BLOOD PRESSURE: 147 MMHG | HEART RATE: 86 BPM | DIASTOLIC BLOOD PRESSURE: 89 MMHG

## 2023-11-29 DIAGNOSIS — R06.81 WITNESSED APNEIC SPELLS: Primary | ICD-10-CM

## 2023-11-29 DIAGNOSIS — E66.3 OVERWEIGHT (BMI 25.0-29.9): ICD-10-CM

## 2023-11-29 DIAGNOSIS — G47.10 HYPERSOMNIA: ICD-10-CM

## 2023-11-29 DIAGNOSIS — R06.83 SNORING: ICD-10-CM

## 2023-11-29 PROCEDURE — G0463 HOSPITAL OUTPT CLINIC VISIT: HCPCS

## 2023-11-29 NOTE — PROGRESS NOTES
"[facility]  Telephone: [phone] / Fax: [phone] [place]  Telephone: [phone] / Fax: [phone] Ramandeep Eng    Referring Physician: Kimber Johnson APRN  PCP: Kimber Johnson APRN    Reason for consult:  sleep apnea    Ash Licona is a 53 y.o.male  was seen in the Sleep Disorders Center today for evaluation of sleep apnea. He reports loud snoring/apneas/gasping respirations during sleep with associated sleep disturbances. It started years ago.  He catches himself waking up from sleep often.  His sleep schedule is 9:30pm-5am. He wakes up feeling rested but after dinner notices significant sleepiness and nodes off on the couch. He gets testosterone injections to help improve energy and stamina. He has no parasomnia.    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    ROS- +post nasal drip, rest is negative.      Ash Licona  has a past medical history of Allergic, Asthma, Blood pressure elevated without history of HTN (2/26/2016), and Hypertension.    Current Medications:    Current Outpatient Medications:     ADVAIR DISKUS 100-50 MCG/DOSE DISKUS, INL 1 PUFF PO BID, Disp: , Rfl: 2    albuterol (PROVENTIL HFA;VENTOLIN HFA) 108 (90 BASE) MCG/ACT inhaler, Inhale 2 puffs Every 4 (Four) Hours As Needed for Wheezing or Shortness of Air., Disp: , Rfl:     amLODIPine-valsartan (Exforge) 5-320 MG per tablet, Take 1 tablet by mouth Daily., Disp: 90 tablet, Rfl: 1    montelukast (SINGULAIR) 10 MG tablet, Take  by mouth., Disp: , Rfl:     Testosterone Cypionate (DEPOTESTOTERONE CYPIONATE) 200 MG/ML injection, , Disp: , Rfl:     I have reviewed Past Medical History, Past Surgical History, Medication List, Social History and Family History as entered in Sleep Questionnaire and EPIC.    ESS  14   Vital Signs /89   Pulse 86   Ht 182.9 cm (72\")   Wt 93.4 kg (206 lb)   SpO2 96%   BMI 27.94 kg/m²  Body mass index is 27.94 kg/m².    General Alert and oriented. No acute " distress noted   Pharynx/Throat Class  IV  Mallampati airway, large tongue, no evidence of redundant lateral pharyngeal tissue. No oral lesions. No thrush. Moist mucous membranes.   Head Normocephalic. Symmetrical. Atraumatic.    Nose No septal deviation. No drainage   Chest Wall Normal shape. Symmetric expansion with respiration. No tenderness.   Neck Trachea midline, no thyromegaly or adenopathy    Lungs Clear to auscultation bilaterally. No wheezes. No rhonchi. No rales. Respirations regular, even and unlabored.   Heart Regular rhythm and normal rate. Normal S1 and S2. No murmur   Abdomen Soft, non-tender and non-distended. Normal bowel sounds. No masses.   Extremities Moves all extremities well. No edema   Psychiatric Normal mood and affect.        Impression:  1. Witnessed apneic spells    2. Snoring    3. Hypersomnia    4. Obesity (BMI 30-39.9)    5. Overweight (BMI 25.0-29.9)          Plan:  I discussed the pathophysiology of obstructive sleep apnea with the patient.  We discussed the adverse outcomes associated with untreated sleep-disordered breathing.  We discussed treatment modalities of obstructive sleep apnea including CPAP device, oral appliance and inspire device. Sleep study will be scheduled to establish a definitive diagnosis of sleep disorder breathing.   Garmin watch data was analyzed with patient and reports restless sleep and fair sleep quality/reduced amount of deep sleep    Weight loss will be strongly beneficial in order to reduce the severity of sleep-disordered breathing.  Patient has narrow oropharyngeal structure.  Caution during activities that require prolonged concentration is strongly advised.  After sleep study results are available, patient will be notified, and appointment will be scheduled to discuss sleep study results and treatment recommendations.    HST was scheduled    I appreciate the opportunity to participate in this patient's care.      NAZANIN Ashley  [facility]  Phone: [phone]      Part of this note may be an electronic transcription/translation of spoken language to printed text using the Dragon Dictation System. Some errors may exist even though the document was edited.

## 2023-12-08 ENCOUNTER — HOSPITAL ENCOUNTER (OUTPATIENT)
Dept: SLEEP MEDICINE | Facility: HOSPITAL | Age: 53
Discharge: HOME | End: 2023-12-08
Admitting: NURSE PRACTITIONER
Payer: COMMERCIAL

## 2023-12-08 DIAGNOSIS — E66.3 OVERWEIGHT (BMI 25.0-29.9): ICD-10-CM

## 2023-12-08 DIAGNOSIS — G47.10 HYPERSOMNIA: ICD-10-CM

## 2023-12-08 DIAGNOSIS — R06.81 WITNESSED APNEIC SPELLS: ICD-10-CM

## 2023-12-08 DIAGNOSIS — R06.83 SNORING: ICD-10-CM

## 2023-12-08 PROCEDURE — 95806 SLEEP STUDY UNATT&RESP EFFT: CPT

## 2023-12-20 ENCOUNTER — TELEPHONE (OUTPATIENT)
Dept: SLEEP MEDICINE | Facility: HOSPITAL | Age: 53
End: 2023-12-20
Payer: COMMERCIAL

## 2024-02-07 DIAGNOSIS — R73.09 ABNORMAL GLUCOSE: ICD-10-CM

## 2024-02-07 DIAGNOSIS — I10 PRIMARY HYPERTENSION: ICD-10-CM

## 2024-02-16 LAB
ALBUMIN SERPL-MCNC: 4.7 G/DL (ref 3.5–5.2)
ALBUMIN/GLOB SERPL: 1.8 G/DL
ALP SERPL-CCNC: 73 U/L (ref 39–117)
ALT SERPL-CCNC: 27 U/L (ref 1–41)
AST SERPL-CCNC: 28 U/L (ref 1–40)
BILIRUB SERPL-MCNC: 0.5 MG/DL (ref 0–1.2)
BUN SERPL-MCNC: 7 MG/DL (ref 6–20)
BUN/CREAT SERPL: 9.1 (ref 7–25)
CALCIUM SERPL-MCNC: 8.9 MG/DL (ref 8.6–10.5)
CHLORIDE SERPL-SCNC: 99 MMOL/L (ref 98–107)
CHOLEST SERPL-MCNC: 154 MG/DL (ref 0–200)
CHOLEST/HDLC SERPL: 4.67 {RATIO}
CO2 SERPL-SCNC: 25.1 MMOL/L (ref 22–29)
CREAT SERPL-MCNC: 0.77 MG/DL (ref 0.76–1.27)
EGFRCR SERPLBLD CKD-EPI 2021: 106.4 ML/MIN/1.73
GLOBULIN SER CALC-MCNC: 2.6 GM/DL
GLUCOSE SERPL-MCNC: 104 MG/DL (ref 65–99)
HBA1C MFR BLD: 5.7 % (ref 4.8–5.6)
HDLC SERPL-MCNC: 33 MG/DL (ref 40–60)
LDLC SERPL CALC-MCNC: 93 MG/DL (ref 0–100)
POTASSIUM SERPL-SCNC: 4.4 MMOL/L (ref 3.5–5.2)
PROT SERPL-MCNC: 7.3 G/DL (ref 6–8.5)
SODIUM SERPL-SCNC: 137 MMOL/L (ref 136–145)
TRIGL SERPL-MCNC: 157 MG/DL (ref 0–150)
VLDLC SERPL CALC-MCNC: 28 MG/DL (ref 5–40)

## 2024-02-21 ENCOUNTER — OFFICE VISIT (OUTPATIENT)
Dept: INTERNAL MEDICINE | Facility: CLINIC | Age: 54
End: 2024-02-21
Payer: COMMERCIAL

## 2024-02-21 VITALS
WEIGHT: 208.8 LBS | BODY MASS INDEX: 28.28 KG/M2 | SYSTOLIC BLOOD PRESSURE: 128 MMHG | HEART RATE: 73 BPM | HEIGHT: 72 IN | DIASTOLIC BLOOD PRESSURE: 80 MMHG | TEMPERATURE: 98.2 F | OXYGEN SATURATION: 98 %

## 2024-02-21 DIAGNOSIS — Z12.5 SCREENING FOR PROSTATE CANCER: ICD-10-CM

## 2024-02-21 DIAGNOSIS — G47.33 OSA ON CPAP: ICD-10-CM

## 2024-02-21 DIAGNOSIS — E78.2 MIXED HYPERLIPIDEMIA: ICD-10-CM

## 2024-02-21 DIAGNOSIS — R73.03 PREDIABETES: Primary | ICD-10-CM

## 2024-02-21 DIAGNOSIS — I10 PRIMARY HYPERTENSION: ICD-10-CM

## 2024-02-21 PROBLEM — Z00.00 HEALTH CARE MAINTENANCE: Status: RESOLVED | Noted: 2019-02-06 | Resolved: 2024-02-21

## 2024-02-21 PROBLEM — R50.9 FEVER: Status: RESOLVED | Noted: 2022-03-29 | Resolved: 2024-02-21

## 2024-02-21 PROBLEM — R53.83 FATIGUE: Status: RESOLVED | Noted: 2022-03-29 | Resolved: 2024-02-21

## 2024-02-21 PROCEDURE — 99214 OFFICE O/P EST MOD 30 MIN: CPT | Performed by: NURSE PRACTITIONER

## 2024-02-21 NOTE — PROGRESS NOTES
Chief Complaint   Patient presents with    Hyperlipidemia     3 mo f/u chol and glucose       Subjective     Ash iLcona is a 53 y.o. male being seen for a follow up appointment today regarding HTN, hyperlipidemia, IFG, and DEMETRIO.     He has HTN. He is on Exforge 5/320 mg daily for HTN. He tolerates this well.     He was last in office 3 months ago, and his glucose was elevated. He cut out soda, sugar and carbs last week. He is getting 10,000 steps a day.    He was seen by Sleep medicine and had a home sleep study showing DEMETRIO.   HOME SLEEP STUDY with Cassie Mcbride APRN (12/08/2023)     History of Present Illness     Allergies   Allergen Reactions    Iodine Hives and Unknown - Low Severity         Current Outpatient Medications:     ADVAIR DISKUS 100-50 MCG/DOSE DISKUS, INL 1 PUFF PO BID, Disp: , Rfl: 2    albuterol (PROVENTIL HFA;VENTOLIN HFA) 108 (90 BASE) MCG/ACT inhaler, Inhale 2 puffs Every 4 (Four) Hours As Needed for Wheezing or Shortness of Air., Disp: , Rfl:     amLODIPine-valsartan (Exforge) 5-320 MG per tablet, Take 1 tablet by mouth Daily., Disp: 90 tablet, Rfl: 1    montelukast (SINGULAIR) 10 MG tablet, Take  by mouth., Disp: , Rfl:     Testosterone Cypionate (DEPOTESTOTERONE CYPIONATE) 200 MG/ML injection, , Disp: , Rfl:     The following portions of the patient's history were reviewed and updated as appropriate: allergies, current medications, past family history, past medical history, past social history, past surgical history, and problem list.    Review of Systems   Constitutional: Negative.    HENT:  Positive for congestion.    Respiratory:  Negative for shortness of breath.    Cardiovascular:  Negative for chest pain and palpitations.   Gastrointestinal: Negative.    Musculoskeletal: Negative.  Negative for arthralgias and back pain.   Allergic/Immunologic: Positive for environmental allergies.   Neurological:  Negative for headaches.   Hematological: Negative.    Psychiatric/Behavioral:  Negative.     All other systems reviewed and are negative.      Assessment     Physical Exam  Vitals reviewed.   Constitutional:       Appearance: Normal appearance. He is obese. He is not ill-appearing.   HENT:      Head: Normocephalic.      Right Ear: Tympanic membrane normal.      Left Ear: Tympanic membrane normal.   Cardiovascular:      Rate and Rhythm: Normal rate and regular rhythm.      Pulses: Normal pulses.      Heart sounds: Normal heart sounds. No murmur heard.  Pulmonary:      Effort: Pulmonary effort is normal. No respiratory distress.      Breath sounds: Normal breath sounds. No stridor.   Musculoskeletal:      Right lower leg: No edema.      Left lower leg: No edema.   Neurological:      General: No focal deficit present.      Mental Status: He is alert and oriented to person, place, and time.   Psychiatric:         Mood and Affect: Mood normal.         Thought Content: Thought content normal.       Plan     His fasting labs were reviewed with the patient from last week.      Diagnosis Plan   1. Prediabetes  Comprehensive Metabolic Panel    Hemoglobin A1c    he has changed his diet, but only last week. Hgb A1c stable at 5.7%.      2. Primary hypertension  CBC & Differential    Comprehensive Metabolic Panel    Lipid Panel With / Chol / HDL Ratio    On Valsartan 320mg/amlodipine 5mg daily/ BP well controlled      3. Mixed hyperlipidemia  Comprehensive Metabolic Panel    Lipid Panel With / Chol / HDL Ratio    LDL goal < 100, improved with diet      4. DEMETRIO on CPAP      Using Cpap nightyl, obtained about 4 weeks ago. Sleeping improved.      5. Screening for prostate cancer  PSA Screen           Discussed glucose regulation in regards to dessert. ASked that he switch to eating any sugars with meals to reduce PP glucose spike.     Follow up in 3 months w labs

## 2024-03-06 ENCOUNTER — OFFICE VISIT (OUTPATIENT)
Dept: SLEEP MEDICINE | Facility: HOSPITAL | Age: 54
End: 2024-03-06
Payer: COMMERCIAL

## 2024-03-06 VITALS
WEIGHT: 206 LBS | HEIGHT: 72 IN | OXYGEN SATURATION: 99 % | HEART RATE: 68 BPM | DIASTOLIC BLOOD PRESSURE: 90 MMHG | BODY MASS INDEX: 27.9 KG/M2 | SYSTOLIC BLOOD PRESSURE: 148 MMHG

## 2024-03-06 DIAGNOSIS — G47.34 NOCTURNAL HYPOXIA: ICD-10-CM

## 2024-03-06 DIAGNOSIS — G47.30 SEVERE SLEEP APNEA: Primary | ICD-10-CM

## 2024-03-06 PROCEDURE — G0463 HOSPITAL OUTPT CLINIC VISIT: HCPCS

## 2024-03-06 NOTE — PROGRESS NOTES
"[facility]  Telephone: [phone] / Fax: [phone] [place]  Telephone: [phone] / Fax: [phone] Ramandeep Eng    PCP: Kimber Johnson APRN    Reason for visit: DEMETRIO f/u    Ash Licona is a 53 y.o.male  was last seen at [facility] sleep lab in November 2023. We did HST in December 2023 that showed severe sleep apnea with sleep related hypoxia. He started auto CPAP 5-20cm H2O and was able to adjust to the machine fairly quickly.  He also got Air Mini from [employer] and uses it for traveling. Level of alertness improved significantly since treatment started. Machine controls the snoring and apneas. He wakes up feeling more refreshed and is able to focus better.     SH- no tobacco, no alcohol, 1 caffeine.    ROS- +nasal congestion, +post nasal drip, +SOA, +wheezing, rest is negative.    DME [employer]    Current Medications:    Current Outpatient Medications:     ADVAIR DISKUS 100-50 MCG/DOSE DISKUS, INL 1 PUFF PO BID, Disp: , Rfl: 2    albuterol (PROVENTIL HFA;VENTOLIN HFA) 108 (90 BASE) MCG/ACT inhaler, Inhale 2 puffs Every 4 (Four) Hours As Needed for Wheezing or Shortness of Air., Disp: , Rfl:     amLODIPine-valsartan (Exforge) 5-320 MG per tablet, Take 1 tablet by mouth Daily., Disp: 90 tablet, Rfl: 1    montelukast (SINGULAIR) 10 MG tablet, Take  by mouth., Disp: , Rfl:     Testosterone Cypionate (DEPOTESTOTERONE CYPIONATE) 200 MG/ML injection, , Disp: , Rfl:    also entered in Sleep Questionnaire    Patient  has a past medical history of Allergic, Asthma, Blood pressure elevated without history of HTN (2/26/2016), and Hypertension.    I have reviewed the Past Medical History, Past Surgical History, Social History and Family History.    Vital Signs /90   Pulse 68   Ht 182.9 cm (72\")   Wt 93.4 kg (206 lb)   SpO2 99%   BMI 27.94 kg/m²  Body mass index is 27.94 kg/m².    General Alert and oriented. No acute distress noted   Pharynx/Throat Class IV  Mallampati airway, large tongue, no " evidence of redundant lateral pharyngeal tissue. No oral lesions. No thrush. Moist mucous membranes.   Head Normocephalic. Symmetrical. Atraumatic.    Nose No septal deviation. No drainage   Chest Wall Normal shape. Symmetric expansion with respiration. No tenderness.   Neck Trachea midline, no thyromegaly or adenopathy    Lungs Clear to auscultation bilaterally. No wheezes. No rhonchi. No rales. Respirations regular, even and unlabored.   Heart Regular rhythm and normal rate. Normal S1 and S2. No murmur   Abdomen Soft, non-tender and non-distended. Normal bowel sounds. No masses.   Extremities Moves all extremities well. No edema   Psychiatric Normal mood and affect.     Testing:  Download 1/4/24-3/4/24 95% use with average nightly use of 6 hours and 14 minutes on auto CPAP 5-20cm H2O, avg pressure 12.4cm H2O, AHI is 1.7/hr, leak is 5.2 L.min.    Study-Dec 2023-Diagnostic findings: The patient tolerated the home sleep testing with monitoring time of 466.5 minutes. The data obtained make this a technically adequate study. The apnea hypopneas index(AHI) was 46.8 per sleep hour.  The AHI during supine position was 56.3 per sleep hour. Mean heart rate of 66.2 BPM.  Snoring was noted 83.8% of sleep time. Lowest oxygen saturation during the study was 83%. Saturation below 89% was noted for 8.1 mins.     Impression:  1. Severe sleep apnea    2. Nocturnal hypoxia          Plan:  Order ovn oximetry on CPAP -RA to ensure previously seen desaturations are fully corrected with CPAP. Otherwise, pt is doing very well. His pre treatment AHI was 46/hr  and AHI on treatment is down to 1.7/hr.    I reviewed download report and original sleep study report with the patient. I advised pt to contact us if PAP pressures feel excessive or insufficient. CPAP is beneficial to patient.    Weight loss will be strongly beneficial to reduce the severity of sleep-disordered breathing.  Caution during activities that require prolonged  concentration is strongly advised if sleepiness returns.     Follow up with Dr. Irizarry in one year    Thank you for allowing me to participate in your patient's care.      NAZANIN Ashley  [facility], [place]  Phone: [phone]      Part of this note may be an electronic transcription/translation of spoken language to printed text using the Dragon Dictation System.

## 2024-04-08 DIAGNOSIS — I10 PRIMARY HYPERTENSION: ICD-10-CM

## 2024-04-08 RX ORDER — AMLODIPINE AND VALSARTAN 5; 320 MG/1; MG/1
TABLET ORAL
Qty: 90 TABLET | Refills: 0 | Status: SHIPPED | OUTPATIENT
Start: 2024-04-08

## 2024-05-22 DIAGNOSIS — E78.2 MIXED HYPERLIPIDEMIA: ICD-10-CM

## 2024-05-22 DIAGNOSIS — I10 PRIMARY HYPERTENSION: ICD-10-CM

## 2024-05-22 DIAGNOSIS — R73.03 PREDIABETES: ICD-10-CM

## 2024-05-22 DIAGNOSIS — Z12.5 SCREENING FOR PROSTATE CANCER: ICD-10-CM

## 2024-05-31 LAB
ALBUMIN SERPL-MCNC: 4.6 G/DL (ref 3.5–5.2)
ALBUMIN/GLOB SERPL: 1.7 G/DL
ALP SERPL-CCNC: 67 U/L (ref 39–117)
ALT SERPL-CCNC: 28 U/L (ref 1–41)
AST SERPL-CCNC: 31 U/L (ref 1–40)
BASOPHILS # BLD AUTO: 0.07 10*3/MM3 (ref 0–0.2)
BASOPHILS NFR BLD AUTO: 1.1 % (ref 0–1.5)
BILIRUB SERPL-MCNC: 0.6 MG/DL (ref 0–1.2)
BUN SERPL-MCNC: 10 MG/DL (ref 6–20)
BUN/CREAT SERPL: 13.3 (ref 7–25)
CALCIUM SERPL-MCNC: 9.3 MG/DL (ref 8.6–10.5)
CHLORIDE SERPL-SCNC: 97 MMOL/L (ref 98–107)
CHOLEST SERPL-MCNC: 166 MG/DL (ref 0–200)
CHOLEST/HDLC SERPL: 4.05 {RATIO}
CO2 SERPL-SCNC: 25.3 MMOL/L (ref 22–29)
CREAT SERPL-MCNC: 0.75 MG/DL (ref 0.76–1.27)
EGFRCR SERPLBLD CKD-EPI 2021: 106.6 ML/MIN/1.73
EOSINOPHIL # BLD AUTO: 0.16 10*3/MM3 (ref 0–0.4)
EOSINOPHIL NFR BLD AUTO: 2.5 % (ref 0.3–6.2)
ERYTHROCYTE [DISTWIDTH] IN BLOOD BY AUTOMATED COUNT: 14 % (ref 12.3–15.4)
GLOBULIN SER CALC-MCNC: 2.7 GM/DL
GLUCOSE SERPL-MCNC: 92 MG/DL (ref 65–99)
HBA1C MFR BLD: 5.8 % (ref 4.8–5.6)
HCT VFR BLD AUTO: 53.1 % (ref 37.5–51)
HDLC SERPL-MCNC: 41 MG/DL (ref 40–60)
HGB BLD-MCNC: 17.7 G/DL (ref 13–17.7)
IMM GRANULOCYTES # BLD AUTO: 0.03 10*3/MM3 (ref 0–0.05)
IMM GRANULOCYTES NFR BLD AUTO: 0.5 % (ref 0–0.5)
LDLC SERPL CALC-MCNC: 99 MG/DL (ref 0–100)
LYMPHOCYTES # BLD AUTO: 1.67 10*3/MM3 (ref 0.7–3.1)
LYMPHOCYTES NFR BLD AUTO: 25.7 % (ref 19.6–45.3)
MCH RBC QN AUTO: 28.7 PG (ref 26.6–33)
MCHC RBC AUTO-ENTMCNC: 33.3 G/DL (ref 31.5–35.7)
MCV RBC AUTO: 86.1 FL (ref 79–97)
MONOCYTES # BLD AUTO: 0.41 10*3/MM3 (ref 0.1–0.9)
MONOCYTES NFR BLD AUTO: 6.3 % (ref 5–12)
NEUTROPHILS # BLD AUTO: 4.15 10*3/MM3 (ref 1.7–7)
NEUTROPHILS NFR BLD AUTO: 63.9 % (ref 42.7–76)
NRBC BLD AUTO-RTO: 0 /100 WBC (ref 0–0.2)
PLATELET # BLD AUTO: 277 10*3/MM3 (ref 140–450)
POTASSIUM SERPL-SCNC: 4.7 MMOL/L (ref 3.5–5.2)
PROT SERPL-MCNC: 7.3 G/DL (ref 6–8.5)
PSA SERPL-MCNC: 0.85 NG/ML (ref 0–4)
RBC # BLD AUTO: 6.17 10*6/MM3 (ref 4.14–5.8)
SODIUM SERPL-SCNC: 136 MMOL/L (ref 136–145)
TRIGL SERPL-MCNC: 148 MG/DL (ref 0–150)
VLDLC SERPL CALC-MCNC: 26 MG/DL (ref 5–40)
WBC # BLD AUTO: 6.49 10*3/MM3 (ref 3.4–10.8)

## 2024-06-04 ENCOUNTER — OFFICE VISIT (OUTPATIENT)
Dept: INTERNAL MEDICINE | Facility: CLINIC | Age: 54
End: 2024-06-04
Payer: COMMERCIAL

## 2024-06-04 VITALS
BODY MASS INDEX: 28.44 KG/M2 | TEMPERATURE: 98.4 F | WEIGHT: 210 LBS | HEART RATE: 78 BPM | HEIGHT: 72 IN | OXYGEN SATURATION: 98 % | SYSTOLIC BLOOD PRESSURE: 132 MMHG | DIASTOLIC BLOOD PRESSURE: 86 MMHG

## 2024-06-04 DIAGNOSIS — Z91.09 ENVIRONMENTAL ALLERGIES: ICD-10-CM

## 2024-06-04 DIAGNOSIS — R73.03 PREDIABETES: ICD-10-CM

## 2024-06-04 DIAGNOSIS — E78.2 MIXED HYPERLIPIDEMIA: ICD-10-CM

## 2024-06-04 DIAGNOSIS — G47.33 OSA ON CPAP: ICD-10-CM

## 2024-06-04 DIAGNOSIS — I10 PRIMARY HYPERTENSION: ICD-10-CM

## 2024-06-04 DIAGNOSIS — Z00.00 ANNUAL PHYSICAL EXAM: Primary | ICD-10-CM

## 2024-06-04 PROBLEM — R06.83 SNORING: Status: RESOLVED | Noted: 2023-10-10 | Resolved: 2024-06-04

## 2024-06-04 PROCEDURE — 93000 ELECTROCARDIOGRAM COMPLETE: CPT | Performed by: NURSE PRACTITIONER

## 2024-06-04 PROCEDURE — 99396 PREV VISIT EST AGE 40-64: CPT | Performed by: NURSE PRACTITIONER

## 2024-06-04 RX ORDER — AZELASTINE HYDROCHLORIDE 137 UG/1
SPRAY, METERED NASAL
COMMUNITY
Start: 2024-05-23

## 2024-06-04 RX ORDER — FLUTICASONE PROPIONATE AND SALMETEROL 250; 50 UG/1; UG/1
POWDER RESPIRATORY (INHALATION)
COMMUNITY
Start: 2024-02-02

## 2024-06-04 RX ORDER — EPINEPHRINE 0.3 MG/.3ML
INJECTION, SOLUTION INTRAMUSCULAR
COMMUNITY
Start: 2024-02-27

## 2024-06-04 NOTE — PROGRESS NOTES
"Annual Exam      Ash Licona is being seen for a Complete physical exam. His last physical was 4-.     Social: He is working full time. He is   to [place]. His household includes wife. He has 3 children, Facundo in [place]. He has 2 children at home.     Lifestyle: He is exercising regularly, hiking. He does not use tobacco. He drinks alcohol raely, once a month.    Screening: Colonoscopy was completed 6-24-20222.  Last labs reviewed from 5- included a lipid panel, PSA, CBC, and CMP.       History of Present Illness       The following portions of the patient's history were reviewed and updated as appropriate: allergies, current medications, past family history, past medical history, past social history, past surgical history, and problem list.    Review of Systems   Constitutional: Negative.    HENT: Negative.     Eyes: Negative.    Respiratory: Negative.     Cardiovascular: Negative.    Gastrointestinal: Negative.    Endocrine: Negative.    Genitourinary: Negative.    Musculoskeletal: Negative.    Hematological: Negative.    Psychiatric/Behavioral: Negative.     All other systems reviewed and are negative.      Objective          /86 (BP Location: Left arm, Patient Position: Sitting, Cuff Size: Large Adult)   Pulse 78   Temp 98.4 °F (36.9 °C) (Infrared)   Ht 182.9 cm (72.01\")   Wt 95.3 kg (210 lb)   SpO2 98%   BMI 28.47 kg/m²     General Appearance:    Alert, cooperative, no distress, appears stated age   Head:    Normocephalic, without obvious abnormality, atraumatic   Eyes:    PERRL, conjunctiva/corneas clear, EOM's intact, fundi     benign, both eyes        Ears:    Normal TM's and external ear canals, both ears   Nose:   Nares normal, septum midline, mucosa normal, no drainage    or sinus tenderness   Throat:   Lips, mucosa, and tongue normal; teeth and gums normal   Neck:   Supple, symmetrical, trachea midline, no adenopathy;        thyroid:  No enlargement/tenderness/nodules; " no carotid    bruit or JVD   Back:     Symmetric, no curvature, ROM normal, no CVA tenderness   Lungs:     Clear to auscultation bilaterally, respirations unlabored   Chest wall:    No tenderness or deformity   Heart:    Regular rate and rhythm, S1 and S2 normal, no murmur, rub    or gallop   Abdomen:     Soft, non-tender, bowel sounds active all four quadrants,     no masses, no organomegaly   Genitalia:    deferred   Rectal:    deferred   Extremities:   Extremities normal, atraumatic, no cyanosis or edema   Pulses:   2+ and symmetric all extremities   Skin:   Skin color, texture, turgor normal, no rashes or lesions   Lymph nodes:   Cervical, supraclavicular, and axillary nodes normal   Neurologic:   CNII-XII intact. Normal strength, sensation and reflexes      throughout              Assessment & Plan   Diagnoses and all orders for this visit:    1. Annual physical exam (Primary)    2. Primary hypertension  -     CBC & Differential; Future  -     Comprehensive Metabolic Panel; Future  -     Lipid Panel With / Chol / HDL Ratio; Future  -     ECG 12 Lead    3. Mixed hyperlipidemia  -     Comprehensive Metabolic Panel; Future  -     Lipid Panel With / Chol / HDL Ratio; Future  -     ECG 12 Lead    4. Prediabetes  -     Comprehensive Metabolic Panel; Future  -     Hemoglobin A1c; Future    5. DEMETRIO on CPAP    6. Environmental allergies        Preventive counseling: Reviewed vaccine recommendations, he is up to date. Weight discussed, and he will resume an exercise regimen.     Follow up in 6 months w labs

## 2024-06-04 NOTE — PROGRESS NOTES
Procedure     ECG 12 Lead    Date/Time: 6/4/2024 2:48 PM  Performed by: Kimber Johnson APRN    Authorized by: Kimber Johnson APRN  Comparison: compared with previous ECG from 2/11/2021  Similar to previous ECG  Rhythm: sinus rhythm  Ectopy comments: none  Rate: normal  BPM: 64  Conduction: conduction normal  Conduction: non-specific intraventricular conduction delay  Conduction comments: HI 0.16 QRS 0.12  ST Segments: ST segments normal  QRS axis: normal  Other: no other findings    Clinical impression: normal ECG

## 2024-07-02 DIAGNOSIS — I10 PRIMARY HYPERTENSION: ICD-10-CM

## 2024-07-02 RX ORDER — AMLODIPINE AND VALSARTAN 5; 320 MG/1; MG/1
TABLET ORAL
Qty: 90 TABLET | Refills: 0 | Status: SHIPPED | OUTPATIENT
Start: 2024-07-02

## 2024-08-01 RX ORDER — SCOLOPAMINE TRANSDERMAL SYSTEM 1 MG/1
1 PATCH, EXTENDED RELEASE TRANSDERMAL
Qty: 4 EACH | Refills: 0 | Status: SHIPPED | OUTPATIENT
Start: 2024-08-01

## 2024-08-02 ENCOUNTER — OFFICE VISIT (OUTPATIENT)
Dept: INTERNAL MEDICINE | Facility: CLINIC | Age: 54
End: 2024-08-02
Payer: COMMERCIAL

## 2024-08-02 VITALS
BODY MASS INDEX: 27.63 KG/M2 | OXYGEN SATURATION: 96 % | TEMPERATURE: 98 F | SYSTOLIC BLOOD PRESSURE: 130 MMHG | DIASTOLIC BLOOD PRESSURE: 80 MMHG | WEIGHT: 203.8 LBS | HEART RATE: 72 BPM

## 2024-08-02 DIAGNOSIS — J45.41 MODERATE PERSISTENT ASTHMATIC BRONCHITIS WITH ACUTE EXACERBATION: Primary | ICD-10-CM

## 2024-08-02 PROBLEM — J45.909 ASTHMATIC BRONCHITIS: Status: ACTIVE | Noted: 2017-01-18

## 2024-08-02 PROCEDURE — 99213 OFFICE O/P EST LOW 20 MIN: CPT | Performed by: NURSE PRACTITIONER

## 2024-08-02 RX ORDER — FLUTICASONE PROPIONATE AND SALMETEROL 500; 50 UG/1; UG/1
1 POWDER RESPIRATORY (INHALATION)
Qty: 60 EACH | Refills: 0 | Status: SHIPPED | OUTPATIENT
Start: 2024-08-02

## 2024-08-02 RX ORDER — CEFDINIR 300 MG/1
300 CAPSULE ORAL 2 TIMES DAILY
Qty: 20 CAPSULE | Refills: 0 | Status: SHIPPED | OUTPATIENT
Start: 2024-08-02

## 2024-08-02 RX ORDER — PREDNISONE 20 MG/1
20 TABLET ORAL DAILY
Qty: 5 TABLET | Refills: 0 | Status: SHIPPED | OUTPATIENT
Start: 2024-08-02

## 2024-08-02 NOTE — PROGRESS NOTES
Chief Complaint   Patient presents with    URI     Harder to breathe but has asthma that is being treated currently          Subjective     Ash Licoan is a 54 y.o. male being seen for an acute visit for chest congestion and chills. He has been sick since a trip to [place] 2 weeks ago. He began with chills, chest congestion, chest tightness, and . He has history of Asthma, currently on IT with Advair 250/50, astelin, and Singulair as maintnenace. He added mucinex on Monday. He has needed his maintenance 2-3 time a day.     URI   Associated symptoms include wheezing. Pertinent negatives include no chest pain or coughing.      Answers submitted by the patient for this visit:  Primary Reason for Visit (Submitted on 7/31/2024)  What is the primary reason for your visit?: Shortness of Breath  Shortness of Breath Questionnaire (Submitted on 7/31/2024)  Chief Complaint: Shortness of breath  chest congestion: Yes    Allergies   Allergen Reactions    Iodine Hives and Unknown - Low Severity         Current Outpatient Medications:     albuterol (PROVENTIL HFA;VENTOLIN HFA) 108 (90 BASE) MCG/ACT inhaler, Inhale 2 puffs Every 4 (Four) Hours As Needed for Wheezing or Shortness of Air., Disp: , Rfl:     ALLERGY SERUM INJECTION, Inject  under the skin into the appropriate area as directed 1 (One) Time., Disp: , Rfl:     amLODIPine-valsartan (EXFORGE) 5-320 MG per tablet, TAKE 1 TABLET BY MOUTH ONE TIME DAILY, Disp: 90 tablet, Rfl: 0    Auvi-Q 0.3 MG/0.3ML solution auto-injector injection, , Disp: , Rfl:     Azelastine HCl 137 MCG/SPRAY solution, , Disp: , Rfl:     Fluticasone-Salmeterol (ADVAIR/WIXELA) 250-50 MCG/ACT DISKUS, , Disp: , Rfl:     montelukast (SINGULAIR) 10 MG tablet, Take  by mouth., Disp: , Rfl:     Scopolamine 1 MG/3DAYS patch, Place 1 patch on the skin as directed by provider Every 72 (Seventy-Two) Hours., Disp: 4 each, Rfl: 0    Testosterone Cypionate (DEPOTESTOTERONE CYPIONATE) 200 MG/ML injection, , Disp: ,  Rfl:     The following portions of the patient's history were reviewed and updated as appropriate: allergies, current medications, past family history, past medical history, past social history, past surgical history, and problem list.    Review of Systems   Constitutional:  Positive for chills.   HENT: Negative.     Respiratory:  Positive for shortness of breath and wheezing. Negative for cough, choking, chest tightness and stridor.    Cardiovascular:  Negative for chest pain, palpitations and leg swelling.   Musculoskeletal: Negative.    Allergic/Immunologic: Positive for environmental allergies. Negative for food allergies.   Neurological: Negative.    Hematological: Negative.    Psychiatric/Behavioral: Negative.     All other systems reviewed and are negative.      Assessment     Physical Exam  Vitals reviewed.   Constitutional:       Appearance: Normal appearance. He is not ill-appearing.   HENT:      Right Ear: Tympanic membrane normal.      Left Ear: Tympanic membrane normal.      Nose: Rhinorrhea present.   Cardiovascular:      Rate and Rhythm: Normal rate and regular rhythm.      Pulses: Normal pulses.      Heart sounds: Normal heart sounds. No murmur heard.  Pulmonary:      Effort: Pulmonary effort is normal. No respiratory distress.      Breath sounds: No stridor. Examination of the right-upper field reveals wheezing. Examination of the left-upper field reveals wheezing. Examination of the right-middle field reveals wheezing. Examination of the left-middle field reveals wheezing. Examination of the right-lower field reveals rhonchi. Wheezing and rhonchi present.   Musculoskeletal:      Cervical back: Neck supple.      Right lower leg: No edema.      Left lower leg: No edema.   Skin:     General: Skin is warm and dry.   Neurological:      General: No focal deficit present.      Mental Status: He is alert and oriented to person, place, and time.   Psychiatric:         Mood and Affect: Mood normal.          Behavior: Behavior normal.         Thought Content: Thought content normal.         Plan     POC Covid negative  Strep negative    Diagnoses and all orders for this visit:    1. Moderate persistent asthmatic bronchitis with acute exacerbation (Primary)  -     Fluticasone-Salmeterol (ADVAIR/WIXELA) 500-50 MCG/ACT DISKUS; Inhale 1 puff 2 (Two) Times a Day.  Dispense: 60 each; Refill: 0  -     cefdinir (OMNICEF) 300 MG capsule; Take 1 capsule by mouth 2 (Two) Times a Day.  Dispense: 20 capsule; Refill: 0  -     predniSONE (DELTASONE) 20 MG tablet; Take 1 tablet by mouth Daily.  Dispense: 5 tablet; Refill: 0        He is traveling to [place]. Hold prednisone to take with him. Used rescue inhaler as needed.     Follow up as needed

## 2024-08-27 DIAGNOSIS — J45.41 MODERATE PERSISTENT ASTHMATIC BRONCHITIS WITH ACUTE EXACERBATION: ICD-10-CM

## 2024-08-29 RX ORDER — FLUTICASONE PROPIONATE AND SALMETEROL 500; 50 UG/1; UG/1
1 POWDER RESPIRATORY (INHALATION) 2 TIMES DAILY
Qty: 60 EACH | Refills: 6 | Status: SHIPPED | OUTPATIENT
Start: 2024-08-29

## 2024-08-29 NOTE — TELEPHONE ENCOUNTER
Rx Refill Note  Requested Prescriptions     Pending Prescriptions Disp Refills    Fluticasone-Salmeterol (ADVAIR/WIXELA) 500-50 MCG/ACT DISKUS [Pharmacy Med Name: Fluticasone-Salmeterol Inhalation Aerosol Powder Breath Activated 500-50 MCG/ACT] 60 each 0     Sig: INHALE 1 PUFF BY MOUTH TWICE DAILY      Last office visit with prescribing clinician: 8/2/2024   Last telemedicine visit with prescribing clinician: Visit date not found   Next office visit with prescribing clinician: 12/11/2024                         Would you like a call back once the refill request has been completed: [] Yes [] No    If the office needs to give you a call back, can they leave a voicemail: [] Yes [] No    Shantelle Rollins MA  08/29/24, 09:23 EDT

## 2024-09-18 DIAGNOSIS — I10 PRIMARY HYPERTENSION: ICD-10-CM

## 2024-09-19 RX ORDER — AMLODIPINE AND VALSARTAN 5; 320 MG/1; MG/1
1 TABLET ORAL DAILY
Qty: 90 TABLET | Refills: 0 | Status: SHIPPED | OUTPATIENT
Start: 2024-09-19

## 2024-09-30 ENCOUNTER — HOSPITAL ENCOUNTER (OUTPATIENT)
Dept: GENERAL RADIOLOGY | Facility: HOSPITAL | Age: 54
Discharge: HOME | End: 2024-09-30
Admitting: NURSE PRACTITIONER
Payer: COMMERCIAL

## 2024-09-30 ENCOUNTER — TRANSCRIBE ORDERS (OUTPATIENT)
Dept: ADMINISTRATIVE | Facility: HOSPITAL | Age: 54
End: 2024-09-30
Payer: COMMERCIAL

## 2024-09-30 DIAGNOSIS — R05.3 CHRONIC COUGH: ICD-10-CM

## 2024-09-30 DIAGNOSIS — J45.30 MILD PERSISTENT ASTHMA, WELL CONTROLLED: ICD-10-CM

## 2024-09-30 DIAGNOSIS — J45.30 MILD PERSISTENT ASTHMA, WELL CONTROLLED: Primary | ICD-10-CM

## 2024-09-30 DIAGNOSIS — K21.9 GASTROESOPHAGEAL REFLUX DISEASE WITHOUT ESOPHAGITIS: ICD-10-CM

## 2024-09-30 DIAGNOSIS — J30.89 OTHER ALLERGIC RHINITIS: ICD-10-CM

## 2024-09-30 DIAGNOSIS — J30.1 ALLERGIC RHINITIS DUE TO POLLEN, UNSPECIFIED SEASONALITY: ICD-10-CM

## 2024-09-30 PROCEDURE — 71046 X-RAY EXAM CHEST 2 VIEWS: CPT

## 2024-10-07 ENCOUNTER — OFFICE VISIT (OUTPATIENT)
Dept: SLEEP MEDICINE | Facility: HOSPITAL | Age: 54
End: 2024-10-07
Payer: COMMERCIAL

## 2024-10-07 VITALS
SYSTOLIC BLOOD PRESSURE: 127 MMHG | BODY MASS INDEX: 28.04 KG/M2 | WEIGHT: 207 LBS | HEART RATE: 73 BPM | DIASTOLIC BLOOD PRESSURE: 49 MMHG | OXYGEN SATURATION: 97 % | HEIGHT: 72 IN

## 2024-10-07 DIAGNOSIS — E66.3 OVERWEIGHT (BMI 25.0-29.9): ICD-10-CM

## 2024-10-07 DIAGNOSIS — G47.33 OBSTRUCTIVE SLEEP APNEA, ADULT: Primary | ICD-10-CM

## 2024-10-07 PROCEDURE — G0463 HOSPITAL OUTPT CLINIC VISIT: HCPCS

## 2024-10-07 NOTE — PROGRESS NOTES
[facility] [place] [facility]  [address]  [place] [address]  [phone]    PCP: Kimber Johnson APRN    Reason for visit:  Sleep disorders: DEMETRIO    Ash is a 54 y.o.male who was seen in the [facility] today. He is very compliant with CPAP. He sleeps from 9:30pm to 5:30am. Currently using N30 but has tried others. No dry mouth. Wakes up rested and no sig EDS.  [place] Sleepiness Scale is 14. Caffeine 3 per day. Alcohol 0 per week.    Ash  reports that he quit smoking about 19 years ago. His smoking use included cigarettes. He started smoking about 39 years ago. He has a 10.1 pack-year smoking history. He has never used smokeless tobacco.    Pertinent Positive Review of Systems of wheezing  Rest of Review of Systems was negative as recorded in Sleep Questionnaire.    Patient  has a past medical history of Allergic, Asthma, Blood pressure elevated without history of HTN (2/26/2016), and Hypertension.     Current Medications:    Current Outpatient Medications:     albuterol (PROVENTIL HFA;VENTOLIN HFA) 108 (90 BASE) MCG/ACT inhaler, Inhale 2 puffs Every 4 (Four) Hours As Needed for Wheezing or Shortness of Air., Disp: , Rfl:     ALLERGY SERUM INJECTION, Inject  under the skin into the appropriate area as directed 1 (One) Time., Disp: , Rfl:     amLODIPine-valsartan (EXFORGE) 5-320 MG per tablet, take 1 tablet by mouth once daily, Disp: 90 tablet, Rfl: 0    Auvi-Q 0.3 MG/0.3ML solution auto-injector injection, , Disp: , Rfl:     Azelastine HCl 137 MCG/SPRAY solution, , Disp: , Rfl:     cefdinir (OMNICEF) 300 MG capsule, Take 1 capsule by mouth 2 (Two) Times a Day., Disp: 20 capsule, Rfl: 0    Fluticasone-Salmeterol (ADVAIR/WIXELA) 250-50 MCG/ACT DISKUS, , Disp: , Rfl:     Fluticasone-Salmeterol (ADVAIR/WIXELA) 500-50 MCG/ACT DISKUS, INHALE 1 PUFF BY MOUTH TWICE DAILY, Disp: 60 each, Rfl: 6    montelukast (SINGULAIR) 10 MG tablet, Take  by mouth., Disp: , Rfl:     predniSONE  "(DELTASONE) 20 MG tablet, Take 1 tablet by mouth Daily., Disp: 5 tablet, Rfl: 0    Scopolamine 1 MG/3DAYS patch, Place 1 patch on the skin as directed by provider Every 72 (Seventy-Two) Hours., Disp: 4 each, Rfl: 0    Testosterone Cypionate (DEPOTESTOTERONE CYPIONATE) 200 MG/ML injection, , Disp: , Rfl:    also entered in Sleep Questionnaire         Vital Signs: /49   Pulse 73   Ht 182.9 cm (72\")   Wt 93.9 kg (207 lb)   SpO2 97%   BMI 28.07 kg/m²     Body mass index is 28.07 kg/m².       Tongue: Large       Dentition: good       Pharynx: Posterior pharyngeal pillars are wide   Mallampatti: II (hard and soft palate, upper portion of tonsils anduvula visible)        General: Alert. Cooperative. Well developed. No acute distress.             Head:  Normocephalic. Symmetrical. Atraumatic.              Nose: No septal deviation. No drainage.          Throat: No oral lesions. No thrush. Moist mucous membranes.    Chest Wall:  Normal shape. Symmetric expansion with respiration. No tenderness.             Neck:  Trachea midline.           Lungs:  Clear to auscultation bilaterally. No wheezes. No rhonchi. No rales. Respirations regular, even and unlabored.            Heart:  Regular rhythm and normal rate. Normal S1 and S2. No murmur.     Abdomen:  Soft, non-tender and non-distended. Normal bowel sounds. No masses.  Extremities:  Moves all extremities well. No edema.    Psychiatric: Normal mood and affect.    Diagnostic data available to date is as below and was reviewed on current visit:  12/11/23: The patient tolerated the home sleep testing with monitoring time of 466.5 minutes. The data obtained make this a technically adequate study. The apnea hypopneas index(AHI) was 46.8 per sleep hour. The AHI during supine position was 56.3 per sleep hour. Mean heart rate of 66.2 BPM. Snoring was noted 83.8% of sleep time. Lowest oxygen saturation during the study was 83%. Saturation below 89% was noted for 8.1 mins. "     Lab Results   Component Value Date    [facility] 373 03/29/2022    FERRITIN 1,244 (H) 03/29/2022       Most current available usage data reviewed on 10/07/2024:  No scans are attached to the encounter or orders placed in the encounter.  100% compliance average 6 hours AHI 0.3 average pressure 6.8-8.6.    DME Company: [employer]    Prescription to [facility] for replacement supplies as below:    nasal mask      Description Replacement    Nasal PILLOWS      A 7034 Nasal Pillows  every 3 mth    A 7033 Repl Nasal Pillows  2 per mth    Nasal MASK/CUSHION     x A 7034 Nasal Mask/Cushion  every 3 mth   x A 7032 Repl Nasal Mask/Cushion  2 per mth    Full Face MASK      A 7030 Full Face Mask  every 3 mth    A 7031 Repl Face Mask  1 per mth      A 4604 Heated Tubing  every 3 mth    A 7037 Standard Tubing  every 3 mth   x A 7035 Headgear  every 3 mth   x A 7046 Repl Humidifier Chamber  every 6 yrs   x A 7038 Disposable Filters  2 per mth   x A 7039 Non-disposable Filter  every 6 mth   x A 7036 Chin Strap  every 6 mth     No orders of the defined types were placed in this encounter.         Impression:  1. Obstructive sleep apnea, adult    2. Overweight (BMI 25.0-29.9)        Plan:  Ash is compliant and doing well with his CPAP machine.  Current pressures are adequate.  He even has a travel machine.  He is 100% compliant and has several masks that he uses interchangeably.  He will continue excellent compliance with the machine and call us with any issues.    Asthma - sees allergist.    I reiterated the importance of effective treatment of obstructive sleep apnea with PAP machine.  Cardiovascular health risks of untreated sleep apnea were again reviewed.  Patient was asked to remain cautious if there is persistent hypersomnolence. The benefit of weight loss in reducing severity of obstructive sleep apnea was discussed.  Patient would benefit from adhering to a strict diet to achieve ideal BMI.     Change of PAP supplies regularly is  important for effective use.  Change of cushion on the mask or plugs on nasal pillows along with disposable filters once every month and change of mask frame, tubing, headgear and Velcro straps every 6 months at the minimum was reiterated.    This patient is compliant with PAP machine and benefits from its use.  Apnea hypopneas index is corrected/improved.  Daytime hypersomnolence has resolved.     Patient will follow up in this clinic in 1 year APRN    Thank you for allowing me to participate in your patient's care.    Electronically signed by Davie Irizarry MD, 10/07/24, 3:13 PM EDT.    Part of this note may be an electronic transcription/translation of spoken language to printed text using the Dragon Dictation System.

## 2024-11-21 DIAGNOSIS — R73.03 PREDIABETES: ICD-10-CM

## 2024-11-21 DIAGNOSIS — E78.2 MIXED HYPERLIPIDEMIA: ICD-10-CM

## 2024-11-21 DIAGNOSIS — I10 PRIMARY HYPERTENSION: ICD-10-CM

## 2024-12-05 LAB
ALBUMIN SERPL-MCNC: 4.6 G/DL (ref 3.5–5.2)
ALBUMIN/GLOB SERPL: 1.5 G/DL
ALP SERPL-CCNC: 64 U/L (ref 39–117)
ALT SERPL-CCNC: 44 U/L (ref 1–41)
AST SERPL-CCNC: 36 U/L (ref 1–40)
BASOPHILS # BLD AUTO: NORMAL 10*3/UL
BILIRUB SERPL-MCNC: 0.6 MG/DL (ref 0–1.2)
BUN SERPL-MCNC: 11 MG/DL (ref 6–20)
BUN/CREAT SERPL: 13.8 (ref 7–25)
CALCIUM SERPL-MCNC: 9.4 MG/DL (ref 8.6–10.5)
CHLORIDE SERPL-SCNC: 95 MMOL/L (ref 98–107)
CHOLEST SERPL-MCNC: 205 MG/DL (ref 0–200)
CHOLEST/HDLC SERPL: 5.13 {RATIO}
CO2 SERPL-SCNC: 26.5 MMOL/L (ref 22–29)
CREAT SERPL-MCNC: 0.8 MG/DL (ref 0.76–1.27)
DIFFERENTIAL COMMENT: ABNORMAL
EGFRCR SERPLBLD CKD-EPI 2021: 104.5 ML/MIN/1.73
EOSINOPHIL # BLD AUTO: NORMAL 10*3/UL
EOSINOPHIL # BLD MANUAL: 0.14 10*3/MM3 (ref 0–0.4)
EOSINOPHIL NFR BLD AUTO: NORMAL %
EOSINOPHIL NFR BLD MANUAL: 2.1 % (ref 0.3–6.2)
ERYTHROCYTE [DISTWIDTH] IN BLOOD BY AUTOMATED COUNT: 13.3 % (ref 12.3–15.4)
GLOBULIN SER CALC-MCNC: 3 GM/DL
GLUCOSE SERPL-MCNC: 102 MG/DL (ref 65–99)
HBA1C MFR BLD: 5.8 % (ref 4.8–5.6)
HCT VFR BLD AUTO: 51 % (ref 37.5–51)
HDLC SERPL-MCNC: 40 MG/DL (ref 40–60)
HGB BLD-MCNC: 17.2 G/DL (ref 13–17.7)
LDLC SERPL CALC-MCNC: 134 MG/DL (ref 0–100)
LYMPHOCYTES # BLD AUTO: NORMAL 10*3/UL
LYMPHOCYTES # BLD MANUAL: 1.61 10*3/MM3 (ref 0.7–3.1)
LYMPHOCYTES NFR BLD AUTO: NORMAL %
LYMPHOCYTES NFR BLD MANUAL: 20.6 % (ref 19.6–45.3)
MCH RBC QN AUTO: 29.9 PG (ref 26.6–33)
MCHC RBC AUTO-ENTMCNC: 33.7 G/DL (ref 31.5–35.7)
MCV RBC AUTO: 88.5 FL (ref 79–97)
MONOCYTES # BLD MANUAL: 0.81 10*3/MM3 (ref 0.1–0.9)
MONOCYTES NFR BLD AUTO: NORMAL %
MONOCYTES NFR BLD MANUAL: 12.4 % (ref 5–12)
NEUTROPHILS # BLD MANUAL: 3.96 10*3/MM3 (ref 1.7–7)
NEUTROPHILS NFR BLD AUTO: NORMAL %
NEUTROPHILS NFR BLD MANUAL: 60.8 % (ref 42.7–76)
PLATELET # BLD AUTO: 205 10*3/MM3 (ref 140–450)
PLATELET BLD QL SMEAR: ABNORMAL
POTASSIUM SERPL-SCNC: 4.5 MMOL/L (ref 3.5–5.2)
PROT SERPL-MCNC: 7.6 G/DL (ref 6–8.5)
RBC # BLD AUTO: 5.76 10*6/MM3 (ref 4.14–5.8)
RBC MORPH BLD: ABNORMAL
SODIUM SERPL-SCNC: 136 MMOL/L (ref 136–145)
TRIGL SERPL-MCNC: 174 MG/DL (ref 0–150)
VLDLC SERPL CALC-MCNC: 31 MG/DL (ref 5–40)
WBC # BLD AUTO: 6.51 10*3/MM3 (ref 3.4–10.8)

## 2024-12-11 ENCOUNTER — OFFICE VISIT (OUTPATIENT)
Dept: INTERNAL MEDICINE | Facility: CLINIC | Age: 54
End: 2024-12-11
Payer: COMMERCIAL

## 2024-12-11 VITALS
SYSTOLIC BLOOD PRESSURE: 130 MMHG | OXYGEN SATURATION: 98 % | TEMPERATURE: 98 F | DIASTOLIC BLOOD PRESSURE: 82 MMHG | HEART RATE: 86 BPM | BODY MASS INDEX: 29.15 KG/M2 | WEIGHT: 215.2 LBS | HEIGHT: 72 IN

## 2024-12-11 DIAGNOSIS — J30.1 SEASONAL ALLERGIC RHINITIS DUE TO POLLEN: ICD-10-CM

## 2024-12-11 DIAGNOSIS — E78.2 MIXED HYPERLIPIDEMIA: ICD-10-CM

## 2024-12-11 DIAGNOSIS — R73.03 PREDIABETES: ICD-10-CM

## 2024-12-11 DIAGNOSIS — Z12.5 SCREENING FOR PROSTATE CANCER: ICD-10-CM

## 2024-12-11 DIAGNOSIS — G47.33 OSA ON CPAP: ICD-10-CM

## 2024-12-11 DIAGNOSIS — I10 PRIMARY HYPERTENSION: Primary | ICD-10-CM
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
## 2024-12-11 PROBLEM — S90.561A INSECT BITE OF RIGHT ANKLE: Status: RESOLVED | Noted: 2022-03-29 | Resolved: 2024-12-11

## 2024-12-11 PROBLEM — J45.909 ASTHMATIC BRONCHITIS: Status: RESOLVED | Noted: 2017-01-18 | Resolved: 2024-12-11

## 2024-12-11 PROCEDURE — 99214 OFFICE O/P EST MOD 30 MIN: CPT | Performed by: NURSE PRACTITIONER

## 2024-12-11 RX ORDER — BLOOD-GLUCOSE METER
KIT MISCELLANEOUS
Qty: 100 EACH | Refills: 12 | Status: SHIPPED | OUTPATIENT
Start: 2024-12-11

## 2024-12-11 RX ORDER — TRIAMCINOLONE ACETONIDE 55 UG/1
2 SPRAY, METERED NASAL DAILY
Start: 2024-12-11 | End: 2025-12-11

## 2024-12-11 RX ORDER — LANCETS 28 GAUGE
EACH MISCELLANEOUS
Qty: 100 EACH | Refills: 1 | Status: SHIPPED | OUTPATIENT
Start: 2024-12-11

## 2024-12-11 RX ORDER — CETIRIZINE HYDROCHLORIDE 10 MG/1
10 TABLET ORAL DAILY
Start: 2024-12-11

## 2024-12-11 RX ORDER — BLOOD-GLUCOSE METER
1 KIT MISCELLANEOUS DAILY
Qty: 1 KIT | Refills: 0 | Status: SHIPPED | OUTPATIENT
Start: 2024-12-11

## 2024-12-11 NOTE — PROGRESS NOTES
Chief Complaint   Patient presents with    Prediabetes     6 month follow up       Subjective     Ash [place] is a 54 y.o. male being seen for a follow up appointment today regarding HTN, hyperlipidemia, IFG, and DEMETRIO.      He has HTN. He is on Exforge 5/320 mg daily for HTN. He tolerates this well.     He has history of prediabetes. He has been so busy with home and work to make his hiking a priority. He has had a large increase in work load.      He was seen by Sleep medicine and had a home sleep study showing DEMETRIO. He is compliant with his C-pap machine.   HOME SLEEP STUDY with Cassie Mcbride APRN (12/08/2023)     History of Present Illness     Allergies   Allergen Reactions    Iodine Hives and Unknown - Low Severity         Current Outpatient Medications:     albuterol (PROVENTIL HFA;VENTOLIN HFA) 108 (90 BASE) MCG/ACT inhaler, Inhale 2 puffs Every 4 (Four) Hours As Needed for Wheezing or Shortness of Air., Disp: , Rfl:     ALLERGY SERUM INJECTION, Inject  under the skin into the appropriate area as directed 1 (One) Time., Disp: , Rfl:     amLODIPine-valsartan (EXFORGE) 5-320 MG per tablet, take 1 tablet by mouth once daily, Disp: 90 tablet, Rfl: 0    Auvi-Q 0.3 MG/0.3ML solution auto-injector injection, , Disp: , Rfl:     Azelastine HCl 137 MCG/SPRAY solution, , Disp: , Rfl:     Fluticasone-Salmeterol (ADVAIR/WIXELA) 500-50 MCG/ACT DISKUS, INHALE 1 PUFF BY MOUTH TWICE DAILY, Disp: 60 each, Rfl: 6    montelukast (SINGULAIR) 10 MG tablet, Take  by mouth., Disp: , Rfl:     Scopolamine 1 MG/3DAYS patch, Place 1 patch on the skin as directed by provider Every 72 (Seventy-Two) Hours., Disp: 4 each, Rfl: 0    Testosterone Cypionate (DEPOTESTOTERONE CYPIONATE) 200 MG/ML injection, , Disp: , Rfl:     Fluticasone-Salmeterol (ADVAIR/WIXELA) 250-50 MCG/ACT DISKUS, , Disp: , Rfl:     The following portions of the patient's history were reviewed and updated as appropriate: allergies, current medications, past family  history, past medical history, past social history, past surgical history, and problem list.    Review of Systems   Constitutional: Negative.    HENT: Negative.     Eyes: Negative.    Respiratory: Negative.  Negative for shortness of breath.    Cardiovascular:  Negative for chest pain and palpitations.   Endocrine: Negative.    Genitourinary: Negative.    Musculoskeletal:  Positive for back pain.   Allergic/Immunologic: Negative.    Neurological: Negative.  Negative for headaches.   Hematological: Negative.    Psychiatric/Behavioral: Negative.     All other systems reviewed and are negative.      Assessment     Physical Exam  Vitals reviewed.   Constitutional:       Appearance: Normal appearance. He is not ill-appearing.   HENT:      Head: Normocephalic.      Right Ear: Tympanic membrane normal.      Left Ear: Tympanic membrane normal.   Cardiovascular:      Rate and Rhythm: Normal rate and regular rhythm.      Pulses: Normal pulses.      Heart sounds: Normal heart sounds. No murmur heard.  Pulmonary:      Effort: Pulmonary effort is normal. No respiratory distress.      Breath sounds: Normal breath sounds. No stridor.   Skin:     General: Skin is warm and dry.   Neurological:      General: No focal deficit present.      Mental Status: He is alert and oriented to person, place, and time.   Psychiatric:         Mood and Affect: Mood normal.         Behavior: Behavior normal.         Thought Content: Thought content normal.         Plan     His fasting labs were reviewed with the patient from last week.     Diagnoses and all orders for this visit:    1. Primary hypertension (Primary)  Comments:  BP at goal on exforge 5/320mg daily  Orders:  -     CBC & Differential; Future  -     Comprehensive Metabolic Panel; Future  -     Lipid Panel With / Chol / HDL Ratio; Future    2. Mixed hyperlipidemia  Comments:  Poorly controlled. LDL goal < 100. Exercise and diet discussed to lower levels  Orders:  -     Comprehensive  Metabolic Panel; Future  -     Lipid Panel With / Chol / HDL Ratio; Future    3. DEMETRIO on CPAP    4. Prediabetes  Comments:  Bring in glucose readinsg to next visit. Resume exercise regimen.  Orders:  -     Blood Glucose Monitoring Suppl (FreeStyle Lite) w/Device kit; Use 1 each Daily.  Dispense: 1 kit; Refill: 0  -     glucose blood (FREESTYLE LITE) test strip; Check glucose once daily (either fasting or 2 hours after meals)  Dispense: 100 each; Refill: 12  -     Lancets (freestyle) lancets; Check glucose once daily  Dispense: 100 each; Refill: 1  -     Comprehensive Metabolic Panel; Future  -     Hemoglobin A1c; Future    5. Screening for prostate cancer  -     PSA Screen; Future    6. Seasonal allergic rhinitis due to pollen  -     cetirizine (zyrTEC) 10 MG tablet; Take 1 tablet by mouth Daily.  -     Triamcinolone Acetonide (Nasacort Allergy 24HR) 55 MCG/ACT nasal inhaler; Administer 2 sprays into the nostril(s) as directed by provider Daily.        I have given him a home glucose meter to rotate fasting and post prandial glucose checks. Goal < 100 fasting and < 140 after meals. He was instructed to use this as a tool for reducing sugars in diet and encouraging activity to control blood glucose and prevent Type 2 Diabetes.       Follow up in 6 months w CPE

## 2025-01-17 DIAGNOSIS — I10 PRIMARY HYPERTENSION: ICD-10-CM

## 2025-01-17 RX ORDER — AMLODIPINE AND VALSARTAN 5; 320 MG/1; MG/1
TABLET ORAL
Qty: 90 TABLET | Refills: 0 | Status: SHIPPED | OUTPATIENT
Start: 2025-01-17

## 2025-01-17 NOTE — TELEPHONE ENCOUNTER
Rx Refill Note  Requested Prescriptions     Pending Prescriptions Disp Refills    amLODIPine-valsartan (EXFORGE) 5-320 MG per tablet [Pharmacy Med Name: amLODIPine Besylate-Valsartan Oral Tablet 5-320 MG] 90 tablet 0     Sig: TAKE 1 TABLET BY MOUTH ONE TIME DAILY      Last office visit with prescribing clinician: 12/11/2024   Last telemedicine visit with prescribing clinician: Visit date not found   Next office visit with prescribing clinician: 6/18/2025                         Would you like a call back once the refill request has been completed: [] Yes [] No    If the office needs to give you a call back, can they leave a voicemail: [] Yes [] No    Ana Wall MA  01/17/25, 18:31 EST

## 2025-02-26 ENCOUNTER — TELEPHONE (OUTPATIENT)
Dept: INTERNAL MEDICINE | Facility: CLINIC | Age: 55
End: 2025-02-26
Payer: COMMERCIAL

## 2025-02-26 NOTE — TELEPHONE ENCOUNTER
He has some chest congestion and SOA when laying down.  He wanted to come in today but we do not have any openings.  I advised the ER and if not there then he needs to go to Urgent Care.  He said is going to Urgent Care.

## 2025-03-05 DIAGNOSIS — J45.41 MODERATE PERSISTENT ASTHMATIC BRONCHITIS WITH ACUTE EXACERBATION: ICD-10-CM

## 2025-03-06 RX ORDER — FLUTICASONE PROPIONATE AND SALMETEROL 500; 50 UG/1; UG/1
1 POWDER RESPIRATORY (INHALATION) 2 TIMES DAILY
Qty: 60 EACH | Refills: 0 | Status: SHIPPED | OUTPATIENT
Start: 2025-03-06

## 2025-03-06 NOTE — TELEPHONE ENCOUNTER
Rx Refill Note  Requested Prescriptions     Pending Prescriptions Disp Refills    Fluticasone-Salmeterol (ADVAIR/WIXELA) 500-50 MCG/ACT DISKUS [Pharmacy Med Name: Fluticasone-Salmeterol Inhalation Aerosol Powder Breath Activated 500-50 MCG/ACT] 60 each 0     Sig: INHALE 1 PUFF BY MOUTH TWICE DAILY      Last office visit with prescribing clinician: 12/11/2024   Last telemedicine visit with prescribing clinician: Visit date not found   Next office visit with prescribing clinician: 6/18/2025                         Would you like a call back once the refill request has been completed: [] Yes [] No    If the office needs to give you a call back, can they leave a voicemail: [] Yes [] No    Ana Wall MA  03/06/25, 11:16 EST     4 = No assist / stand by assistance

## 2025-03-18 DIAGNOSIS — I10 PRIMARY HYPERTENSION: ICD-10-CM

## 2025-03-19 ENCOUNTER — TELEPHONE (OUTPATIENT)
Dept: INTERNAL MEDICINE | Facility: CLINIC | Age: 55
End: 2025-03-19
Payer: COMMERCIAL

## 2025-03-19 DIAGNOSIS — I10 PRIMARY HYPERTENSION: ICD-10-CM

## 2025-03-19 RX ORDER — AMLODIPINE AND VALSARTAN 5; 320 MG/1; MG/1
TABLET ORAL
Qty: 90 TABLET | Refills: 0 | Status: SHIPPED | OUTPATIENT
Start: 2025-03-19 | End: 2025-03-19 | Stop reason: SDUPTHER

## 2025-03-19 NOTE — TELEPHONE ENCOUNTER
Rx Refill Note  Requested Prescriptions     Pending Prescriptions Disp Refills    amLODIPine-valsartan (EXFORGE) 5-320 MG per tablet [Pharmacy Med Name: amLODIPine Besylate-Valsartan Oral Tablet 5-320 MG] 90 tablet 0     Sig: TAKE 1 TABLET BY MOUTH ONE TIME DAILY      Last office visit with prescribing clinician: 12/11/2024   Last telemedicine visit with prescribing clinician: Visit date not found   Next office visit with prescribing clinician: 3/19/2025                         Would you like a call back once the refill request has been completed: [] Yes [] No    If the office needs to give you a call back, can they leave a voicemail: [] Yes [] No    Ana Wall MA  03/19/25, 14:15 EDT

## 2025-03-20 RX ORDER — AMLODIPINE AND VALSARTAN 5; 320 MG/1; MG/1
1 TABLET ORAL DAILY
Qty: 90 TABLET | Refills: 1 | Status: SHIPPED | OUTPATIENT
Start: 2025-03-20

## 2025-03-20 NOTE — TELEPHONE ENCOUNTER
Ok to resend to [facility]? Was previously incorrectly sent to [facility]    Rx Refill Note  Requested Prescriptions     Pending Prescriptions Disp Refills    amLODIPine-valsartan (EXFORGE) 5-320 MG per tablet 90 tablet 0      Last office visit with prescribing clinician: 12/11/2024   Last telemedicine visit with prescribing clinician: Visit date not found   Next office visit with prescribing clinician: 6/18/2025                         Would you like a call back once the refill request has been completed: [] Yes [] No    If the office needs to give you a call back, can they leave a voicemail: [] Yes [] No    Shantelle Rollins MA  03/20/25, 07:51 EDT

## 2025-04-15 ENCOUNTER — HOSPITAL ENCOUNTER (OUTPATIENT)
Dept: GENERAL RADIOLOGY | Facility: HOSPITAL | Age: 55
Discharge: HOME | End: 2025-04-15
Payer: COMMERCIAL

## 2025-04-15 ENCOUNTER — TRANSCRIBE ORDERS (OUTPATIENT)
Dept: ADMINISTRATIVE | Facility: HOSPITAL | Age: 55
End: 2025-04-15
Payer: COMMERCIAL

## 2025-04-15 ENCOUNTER — LAB (OUTPATIENT)
Dept: LAB | Facility: HOSPITAL | Age: 55
End: 2025-04-15
Payer: COMMERCIAL

## 2025-04-15 DIAGNOSIS — J45.40 ASTHMA, MODERATE PERSISTENT, WELL-CONTROLLED: ICD-10-CM

## 2025-04-15 DIAGNOSIS — R06.2 WHEEZE: ICD-10-CM

## 2025-04-15 DIAGNOSIS — J45.40 ASTHMA, MODERATE PERSISTENT, WELL-CONTROLLED: Primary | ICD-10-CM

## 2025-04-15 LAB
ALPHA1 GLOB MFR UR ELPH: 154 MG/DL (ref 90–200)
BASOPHILS # BLD AUTO: 0.09 10*3/MM3 (ref 0–0.2)
BASOPHILS NFR BLD AUTO: 0.9 % (ref 0–1.5)
DEPRECATED RDW RBC AUTO: 38.3 FL (ref 37–54)
EOSINOPHIL # BLD AUTO: 0.19 10*3/MM3 (ref 0–0.4)
EOSINOPHIL NFR BLD AUTO: 1.8 % (ref 0.3–6.2)
ERYTHROCYTE [DISTWIDTH] IN BLOOD BY AUTOMATED COUNT: 13.4 % (ref 12.3–15.4)
HCT VFR BLD AUTO: 49.7 % (ref 37.5–51)
HGB BLD-MCNC: 16.1 G/DL (ref 13–17.7)
IMM GRANULOCYTES # BLD AUTO: 0.06 10*3/MM3 (ref 0–0.05)
IMM GRANULOCYTES NFR BLD AUTO: 0.6 % (ref 0–0.5)
LYMPHOCYTES # BLD AUTO: 1.65 10*3/MM3 (ref 0.7–3.1)
LYMPHOCYTES NFR BLD AUTO: 16 % (ref 19.6–45.3)
MCH RBC QN AUTO: 26.1 PG (ref 26.6–33)
MCHC RBC AUTO-ENTMCNC: 32.4 G/DL (ref 31.5–35.7)
MCV RBC AUTO: 80.6 FL (ref 79–97)
MONOCYTES # BLD AUTO: 0.68 10*3/MM3 (ref 0.1–0.9)
MONOCYTES NFR BLD AUTO: 6.6 % (ref 5–12)
NEUTROPHILS NFR BLD AUTO: 7.63 10*3/MM3 (ref 1.7–7)
NEUTROPHILS NFR BLD AUTO: 74.1 % (ref 42.7–76)
NRBC BLD AUTO-RTO: 0 /100 WBC (ref 0–0.2)
PLATELET # BLD AUTO: 379 10*3/MM3 (ref 140–450)
PMV BLD AUTO: 9.8 FL (ref 6–12)
RBC # BLD AUTO: 6.17 10*6/MM3 (ref 4.14–5.8)
WBC NRBC COR # BLD AUTO: 10.3 10*3/MM3 (ref 3.4–10.8)

## 2025-04-15 PROCEDURE — 82103 ALPHA-1-ANTITRYPSIN TOTAL: CPT

## 2025-04-15 PROCEDURE — 71046 X-RAY EXAM CHEST 2 VIEWS: CPT

## 2025-04-15 PROCEDURE — 36415 COLL VENOUS BLD VENIPUNCTURE: CPT

## 2025-04-15 PROCEDURE — 82785 ASSAY OF IGE: CPT

## 2025-04-15 PROCEDURE — 85025 COMPLETE CBC W/AUTO DIFF WBC: CPT

## 2025-04-23 LAB — IGE SERPL-ACNC: 89 IU/ML (ref 6–495)

## 2025-06-03 DIAGNOSIS — E78.2 MIXED HYPERLIPIDEMIA: ICD-10-CM

## 2025-06-03 DIAGNOSIS — R73.03 PREDIABETES: ICD-10-CM

## 2025-06-03 DIAGNOSIS — I10 PRIMARY HYPERTENSION: ICD-10-CM

## 2025-06-03 DIAGNOSIS — Z12.5 SCREENING FOR PROSTATE CANCER: ICD-10-CM

## (undated) DEVICE — GLV SURG SENSICARE PI MIC PF SZ7.5 LF STRL

## (undated) DEVICE — ADAPT CLN BIOGUARD AIR/H2O DISP

## (undated) DEVICE — SPNG GZ WOVN 4X4IN 12PLY 10/BX STRL

## (undated) DEVICE — BW-412T DISP COMBO CLEANING BRUSH: Brand: SINGLE USE COMBINATION CLEANING BRUSH

## (undated) DEVICE — SYR LL 3CC

## (undated) DEVICE — FRCP BX RADJAW4 NDL 2.8 240CM LG OG BX40

## (undated) DEVICE — JACKT LAB F/R KNIT CUFF/COLR XLG BLU

## (undated) DEVICE — VIAL FORMALIN CAP 10P 40ML

## (undated) DEVICE — KT ORCA ORCAPOD DISP STRL

## (undated) DEVICE — SAFELINER SUCTION CANISTER 1000CC: Brand: DEROYAL

## (undated) DEVICE — Device